# Patient Record
Sex: FEMALE | Race: BLACK OR AFRICAN AMERICAN | NOT HISPANIC OR LATINO | Employment: UNEMPLOYED | URBAN - METROPOLITAN AREA
[De-identification: names, ages, dates, MRNs, and addresses within clinical notes are randomized per-mention and may not be internally consistent; named-entity substitution may affect disease eponyms.]

---

## 2017-07-28 ENCOUNTER — HISTORICAL (OUTPATIENT)
Dept: LAB | Facility: HOSPITAL | Age: 77
End: 2017-07-28

## 2017-07-28 LAB
ABS NEUT (OLG): 4.2 X10(3)/MCL (ref 1.5–6.9)
ALBUMIN SERPL-MCNC: 3 GM/DL (ref 3.4–5)
ALBUMIN/GLOB SERPL: 0.6 RATIO
ALP SERPL-CCNC: 95 UNIT/L (ref 30–113)
ALT SERPL-CCNC: 14 UNIT/L (ref 10–45)
AST SERPL-CCNC: 21 UNIT/L (ref 15–37)
BASOPHILS # BLD AUTO: 0 X10(3)/MCL (ref 0–0.1)
BASOPHILS NFR BLD AUTO: 0 % (ref 0–1)
BILIRUB SERPL-MCNC: 0.3 MG/DL (ref 0.1–0.9)
BILIRUBIN DIRECT+TOT PNL SERPL-MCNC: 0.1 MG/DL (ref 0–0.3)
BILIRUBIN DIRECT+TOT PNL SERPL-MCNC: 0.2 MG/DL
BUN SERPL-MCNC: 16 MG/DL (ref 10–20)
CALCIUM SERPL-MCNC: 8.6 MG/DL (ref 8–10.5)
CHLORIDE SERPL-SCNC: 99 MMOL/L (ref 100–108)
CO2 SERPL-SCNC: 31 MMOL/L (ref 21–35)
COLOR STL: NORMAL
CONSISTENCY STL: NORMAL
CREAT SERPL-MCNC: 1.29 MG/DL (ref 0.7–1.3)
EOSINOPHIL # BLD AUTO: 0.2 X10(3)/MCL (ref 0–0.6)
EOSINOPHIL NFR BLD AUTO: 3 % (ref 0–5)
ERYTHROCYTE [DISTWIDTH] IN BLOOD BY AUTOMATED COUNT: 13.6 % (ref 11.5–17)
GLOBULIN SER-MCNC: 5.4 GM/DL
GLUCOSE SERPL-MCNC: 102 MG/DL (ref 75–116)
HCT VFR BLD AUTO: 34.5 % (ref 36–48)
HEMOCCULT SP1 STL QL: NEGATIVE
HGB BLD-MCNC: 11.5 GM/DL (ref 12–16)
INR PPP: 1 (ref 0–1.2)
LYMPHOCYTES # BLD AUTO: 1.9 X10(3)/MCL (ref 0.5–4.1)
LYMPHOCYTES NFR BLD AUTO: 27.3 % (ref 15–40)
MCH RBC QN AUTO: 28 PG (ref 27–34)
MCHC RBC AUTO-ENTMCNC: 33 GM/DL (ref 31–36)
MCV RBC AUTO: 85 FL (ref 80–99)
MONOCYTES # BLD AUTO: 0.7 X10(3)/MCL (ref 0–1.1)
MONOCYTES NFR BLD AUTO: 10 % (ref 4–12)
NEUTROPHILS # BLD AUTO: 4.2 X10(3)/MCL (ref 1.5–6.9)
NEUTROPHILS NFR BLD AUTO: 59 % (ref 43–75)
PLATELET # BLD AUTO: 263 X10(3)/MCL (ref 140–400)
PMV BLD AUTO: 9.9 FL (ref 6.8–10)
POTASSIUM SERPL-SCNC: 3.7 MMOL/L (ref 3.6–5.2)
PROT SERPL-MCNC: 8.4 GM/DL (ref 6.4–8.2)
PROTHROMBIN TIME: 10.7 SECOND(S) (ref 9–12)
RBC # BLD AUTO: 4.06 X10(6)/MCL (ref 4.2–5.4)
SODIUM SERPL-SCNC: 137 MMOL/L (ref 135–145)
WBC # SPEC AUTO: 7.1 X10(3)/MCL (ref 4.5–11.5)

## 2018-08-24 ENCOUNTER — HISTORICAL (OUTPATIENT)
Dept: LAB | Facility: HOSPITAL | Age: 78
End: 2018-08-24

## 2018-08-24 LAB
ABS NEUT (OLG): 2.6 X10(3)/MCL (ref 1.5–6.9)
ALBUMIN SERPL-MCNC: 3.1 GM/DL (ref 3.4–5)
ALBUMIN/GLOB SERPL: 0.6 RATIO
ALP SERPL-CCNC: 104 UNIT/L (ref 30–113)
ALT SERPL-CCNC: 14 UNIT/L (ref 10–45)
AST SERPL-CCNC: 21 UNIT/L (ref 15–37)
BASOPHILS # BLD AUTO: 0 X10(3)/MCL (ref 0–0.1)
BASOPHILS NFR BLD AUTO: 0 % (ref 0–1)
BILIRUB SERPL-MCNC: 0.3 MG/DL (ref 0.1–0.9)
BILIRUBIN DIRECT+TOT PNL SERPL-MCNC: 0.1 MG/DL (ref 0–0.3)
BILIRUBIN DIRECT+TOT PNL SERPL-MCNC: 0.2 MG/DL
BUN SERPL-MCNC: 18 MG/DL (ref 10–20)
CALCIUM SERPL-MCNC: 8.6 MG/DL (ref 8–10.5)
CHLORIDE SERPL-SCNC: 105 MMOL/L (ref 100–108)
CHOLEST SERPL-MCNC: 212 MG/DL (ref 140–200)
CHOLEST/HDLC SERPL: 5 MG/DL (ref 0–8)
CO2 SERPL-SCNC: 31 MMOL/L (ref 21–35)
CREAT SERPL-MCNC: 1.36 MG/DL (ref 0.7–1.3)
EOSINOPHIL # BLD AUTO: 0.1 X10(3)/MCL (ref 0–0.6)
EOSINOPHIL NFR BLD AUTO: 3 % (ref 0–5)
ERYTHROCYTE [DISTWIDTH] IN BLOOD BY AUTOMATED COUNT: 14.7 % (ref 11.5–17)
EST. AVERAGE GLUCOSE BLD GHB EST-MCNC: 120 MG/DL
FT4I SERPL CALC-MCNC: 2.4 (ref 5.9–13.1)
GLOBULIN SER-MCNC: 4.8 GM/DL
GLUCOSE SERPL-MCNC: 108 MG/DL (ref 75–116)
HBA1C MFR BLD: 5.8 % (ref 4.8–6)
HCT VFR BLD AUTO: 35.5 % (ref 36–48)
HDLC SERPL-MCNC: 44 MG/DL (ref 35–59)
HGB BLD-MCNC: 11.7 GM/DL (ref 12–16)
LDLC SERPL CALC-MCNC: 151 MG/DL (ref 100–129)
LYMPHOCYTES # BLD AUTO: 1.3 X10(3)/MCL (ref 0.5–4.1)
LYMPHOCYTES NFR BLD AUTO: 29.2 % (ref 15–40)
MCH RBC QN AUTO: 28 PG (ref 27–34)
MCHC RBC AUTO-ENTMCNC: 33 GM/DL (ref 31–36)
MCV RBC AUTO: 85 FL (ref 80–99)
MONOCYTES # BLD AUTO: 0.5 X10(3)/MCL (ref 0–1.1)
MONOCYTES NFR BLD AUTO: 10 % (ref 4–12)
NEUTROPHILS # BLD AUTO: 2.6 X10(3)/MCL (ref 1.5–6.9)
NEUTROPHILS NFR BLD AUTO: 57 % (ref 43–75)
PLATELET # BLD AUTO: 242 X10(3)/MCL (ref 140–400)
PMV BLD AUTO: 10.9 FL (ref 6.8–10)
POTASSIUM SERPL-SCNC: 4.3 MMOL/L (ref 3.6–5.2)
PROT SERPL-MCNC: 7.9 GM/DL (ref 6.4–8.2)
RBC # BLD AUTO: 4.19 X10(6)/MCL (ref 4.2–5.4)
SODIUM SERPL-SCNC: 139 MMOL/L (ref 135–145)
T3FREE SERPL-MCNC: 2.51 PG/ML (ref 2.18–3.98)
T3RU NFR SERPL: 31 % (ref 30–39)
T4 SERPL-MCNC: 7.9 MCG/DL (ref 5.3–11.5)
TRIGL SERPL-MCNC: 105 MG/DL (ref 35–150)
TSH SERPL-ACNC: 1.24 MIU/ML (ref 0.36–3.74)
VLDLC SERPL CALC-MCNC: 21 MG/DL
WBC # SPEC AUTO: 4.6 X10(3)/MCL (ref 4.5–11.5)

## 2019-08-09 ENCOUNTER — HISTORICAL (OUTPATIENT)
Dept: LAB | Facility: HOSPITAL | Age: 79
End: 2019-08-09

## 2019-08-09 LAB
ABS NEUT (OLG): 2.4 X10(3)/MCL (ref 1.5–6.9)
ALBUMIN SERPL-MCNC: 2.9 GM/DL (ref 3.4–5)
ALBUMIN/GLOB SERPL: 0.6 RATIO
ALP SERPL-CCNC: 110 UNIT/L (ref 30–113)
ALT SERPL-CCNC: 13 UNIT/L (ref 10–45)
AST SERPL-CCNC: 18 UNIT/L (ref 15–37)
BILIRUB SERPL-MCNC: 0.3 MG/DL (ref 0.1–0.9)
BILIRUBIN DIRECT+TOT PNL SERPL-MCNC: 0.1 MG/DL (ref 0–0.3)
BILIRUBIN DIRECT+TOT PNL SERPL-MCNC: 0.2 MG/DL
BUN SERPL-MCNC: 19 MG/DL (ref 10–20)
CALCIUM SERPL-MCNC: 8.1 MG/DL (ref 8–10.5)
CHLORIDE SERPL-SCNC: 107 MMOL/L (ref 100–108)
CHOLEST SERPL-MCNC: 191 MG/DL (ref 140–200)
CHOLEST/HDLC SERPL: 4 MG/DL (ref 0–8)
CO2 SERPL-SCNC: 27 MMOL/L (ref 21–35)
CREAT SERPL-MCNC: 1.54 MG/DL (ref 0.7–1.3)
ERYTHROCYTE [DISTWIDTH] IN BLOOD BY AUTOMATED COUNT: 14 % (ref 11.5–17)
EST. AVERAGE GLUCOSE BLD GHB EST-MCNC: 114 MG/DL
FT4I SERPL CALC-MCNC: 2 (ref 5.9–13.1)
GLOBULIN SER-MCNC: 4.5 GM/DL
GLUCOSE SERPL-MCNC: 98 MG/DL (ref 75–116)
HBA1C MFR BLD: 5.6 % (ref 4.8–6)
HCT VFR BLD AUTO: 33.1 % (ref 36–48)
HDLC SERPL-MCNC: 43 MG/DL (ref 35–59)
HGB BLD-MCNC: 11 GM/DL (ref 12–16)
LDLC SERPL CALC-MCNC: 127 MG/DL (ref 100–129)
MCH RBC QN AUTO: 29 PG (ref 27–34)
MCHC RBC AUTO-ENTMCNC: 33 GM/DL (ref 31–36)
MCV RBC AUTO: 88 FL (ref 80–99)
PLATELET # BLD AUTO: 196 X10(3)/MCL (ref 140–400)
PMV BLD AUTO: 9.8 FL (ref 6.8–10)
POTASSIUM SERPL-SCNC: 4.5 MMOL/L (ref 3.6–5.2)
PROT SERPL-MCNC: 7.4 GM/DL (ref 6.4–8.2)
RBC # BLD AUTO: 3.74 X10(6)/MCL (ref 4.2–5.4)
SODIUM SERPL-SCNC: 142 MMOL/L (ref 135–145)
T3FREE SERPL-MCNC: 2.35 PG/ML (ref 2.18–3.98)
T3RU NFR SERPL: 32 % (ref 30–39)
T4 SERPL-MCNC: 6.4 MCG/DL (ref 5.3–11.5)
TRIGL SERPL-MCNC: 107 MG/DL (ref 35–150)
TSH SERPL-ACNC: 1.84 MIU/ML (ref 0.36–3.74)
VLDLC SERPL CALC-MCNC: 21 MG/DL
WBC # SPEC AUTO: 4.6 X10(3)/MCL (ref 4.5–11.5)

## 2019-11-05 ENCOUNTER — HISTORICAL (OUTPATIENT)
Dept: RADIOLOGY | Facility: HOSPITAL | Age: 79
End: 2019-11-05

## 2019-12-13 ENCOUNTER — HISTORICAL (OUTPATIENT)
Dept: LAB | Facility: HOSPITAL | Age: 79
End: 2019-12-13

## 2019-12-13 LAB
ABS NEUT (OLG): 2.9 X10(3)/MCL (ref 1.5–6.9)
ALBUMIN SERPL-MCNC: 3 GM/DL (ref 3.4–5)
ALBUMIN/GLOB SERPL: 0.7 RATIO
ALP SERPL-CCNC: 131 UNIT/L (ref 30–113)
ALT SERPL-CCNC: 14 UNIT/L (ref 10–45)
AST SERPL-CCNC: 21 UNIT/L (ref 15–37)
BILIRUB SERPL-MCNC: 0.2 MG/DL (ref 0.1–0.9)
BUN SERPL-MCNC: 17 MG/DL (ref 10–20)
CALCIUM SERPL-MCNC: 8.2 MG/DL (ref 8–10.5)
CHLORIDE SERPL-SCNC: 105 MMOL/L (ref 100–108)
CHOLEST SERPL-MCNC: 173 MG/DL (ref 140–200)
CHOLEST/HDLC SERPL: 4 MG/DL (ref 0–8)
CO2 SERPL-SCNC: 28 MMOL/L (ref 21–35)
CORTIS SERPL-SCNC: 17 MCG/DL
CREAT SERPL-MCNC: 1.57 MG/DL (ref 0.7–1.3)
DEPRECATED CALCIDIOL+CALCIFEROL SERPL-MC: 13.82 NG/ML (ref 30–80)
ERYTHROCYTE [DISTWIDTH] IN BLOOD BY AUTOMATED COUNT: 13.4 % (ref 11.5–17)
ESTRADIOL SERPL HS-MCNC: 31 PG/ML
FOLATE SERPL-MCNC: 4.9 NG/ML (ref 8.6–58.9)
FSH SERPL-ACNC: 54.8 MIU/ML
FT4I SERPL CALC-MCNC: 2.5 (ref 5.9–13.1)
GLOBULIN SER-MCNC: 4.6 GM/DL
GLUCOSE SERPL-MCNC: 85 MG/DL (ref 75–116)
HCT VFR BLD AUTO: 35.4 % (ref 36–48)
HDLC SERPL-MCNC: 49 MG/DL (ref 35–59)
HGB BLD-MCNC: 11.3 GM/DL (ref 12–16)
LDLC SERPL CALC-MCNC: 110 MG/DL (ref 100–129)
MCH RBC QN AUTO: 29 PG (ref 27–34)
MCHC RBC AUTO-ENTMCNC: 32 GM/DL (ref 31–36)
MCV RBC AUTO: 91 FL (ref 80–99)
PLATELET # BLD AUTO: 219 X10(3)/MCL (ref 140–400)
PMV BLD AUTO: 9.8 FL (ref 6.8–10)
POTASSIUM SERPL-SCNC: 3.9 MMOL/L (ref 3.6–5.2)
PROGEST SERPL-MCNC: 0.31 NG/ML
PROT SERPL-MCNC: 7.6 GM/DL (ref 6.4–8.2)
RBC # BLD AUTO: 3.89 X10(6)/MCL (ref 4.2–5.4)
SODIUM SERPL-SCNC: 141 MMOL/L (ref 135–145)
T3FREE SERPL-MCNC: 2.46 PG/ML (ref 2.18–3.98)
T3RU NFR SERPL: 33 % (ref 30–39)
T4 SERPL-MCNC: 7.5 MCG/DL (ref 5.3–11.5)
TESTOST SERPL-MCNC: 48 NG/DL (ref 14–76)
TRIGL SERPL-MCNC: 106 MG/DL (ref 35–150)
TSH SERPL-ACNC: 1.68 MIU/ML (ref 0.36–3.74)
VLDLC SERPL CALC-MCNC: 21 MG/DL
WBC # SPEC AUTO: 5.3 X10(3)/MCL (ref 4.5–11.5)

## 2020-02-17 ENCOUNTER — HISTORICAL (OUTPATIENT)
Dept: LAB | Facility: HOSPITAL | Age: 80
End: 2020-02-17

## 2020-02-17 LAB
ABS NEUT (OLG): 2.4 X10(3)/MCL (ref 1.5–6.9)
ALBUMIN SERPL-MCNC: 3.1 GM/DL (ref 3.4–5)
ALBUMIN/GLOB SERPL: 0.7 RATIO
ALP SERPL-CCNC: 115 UNIT/L (ref 30–113)
ALT SERPL-CCNC: 13 UNIT/L (ref 10–45)
AST SERPL-CCNC: 23 UNIT/L (ref 15–37)
BILIRUB SERPL-MCNC: 0.2 MG/DL (ref 0.1–0.9)
BILIRUBIN DIRECT+TOT PNL SERPL-MCNC: 0.1 MG/DL
BILIRUBIN DIRECT+TOT PNL SERPL-MCNC: 0.1 MG/DL (ref 0–0.3)
BUN SERPL-MCNC: 30 MG/DL (ref 10–20)
CALCIUM SERPL-MCNC: 8.1 MG/DL (ref 8–10.5)
CHLORIDE SERPL-SCNC: 99 MMOL/L (ref 100–108)
CHOLEST SERPL-MCNC: 207 MG/DL (ref 140–200)
CHOLEST/HDLC SERPL: 4 MG/DL (ref 0–8)
CO2 SERPL-SCNC: 26 MMOL/L (ref 21–35)
CORTIS SERPL-SCNC: 17 MCG/DL
CREAT SERPL-MCNC: 1.67 MG/DL (ref 0.7–1.3)
DEPRECATED CALCIDIOL+CALCIFEROL SERPL-MC: 18.08 NG/ML (ref 30–80)
ERYTHROCYTE [DISTWIDTH] IN BLOOD BY AUTOMATED COUNT: 14.5 % (ref 11.5–17)
EST. AVERAGE GLUCOSE BLD GHB EST-MCNC: 114 MG/DL
ESTRADIOL SERPL HS-MCNC: 20 PG/ML
FSH SERPL-ACNC: 55.8 MIU/ML
GLOBULIN SER-MCNC: 4.5 GM/DL
GLUCOSE SERPL-MCNC: 97 MG/DL (ref 75–116)
HBA1C MFR BLD: 5.6 % (ref 4.8–6)
HCT VFR BLD AUTO: 33.2 % (ref 36–48)
HDLC SERPL-MCNC: 59 MG/DL (ref 35–59)
HGB BLD-MCNC: 10.8 GM/DL (ref 12–16)
LDLC SERPL CALC-MCNC: 131 MG/DL (ref 100–129)
MCH RBC QN AUTO: 28 PG (ref 27–34)
MCHC RBC AUTO-ENTMCNC: 32 GM/DL (ref 31–36)
MCV RBC AUTO: 88 FL (ref 80–99)
PLATELET # BLD AUTO: 198 X10(3)/MCL (ref 140–400)
PMV BLD AUTO: 10.4 FL (ref 6.8–10)
POTASSIUM SERPL-SCNC: 4.1 MMOL/L (ref 3.6–5.2)
PROGEST SERPL-MCNC: 0.39 NG/ML
PROT SERPL-MCNC: 7.6 GM/DL (ref 6.4–8.2)
RBC # BLD AUTO: 3.79 X10(6)/MCL (ref 4.2–5.4)
SODIUM SERPL-SCNC: 136 MMOL/L (ref 135–145)
T3FREE SERPL-MCNC: 2.11 PG/ML (ref 2.18–3.98)
TESTOST SERPL-MCNC: 50 NG/DL (ref 14–76)
TRIGL SERPL-MCNC: 88 MG/DL (ref 35–150)
VLDLC SERPL CALC-MCNC: 18 MG/DL
WBC # SPEC AUTO: 4.3 X10(3)/MCL (ref 4.5–11.5)

## 2020-11-13 ENCOUNTER — HISTORICAL (OUTPATIENT)
Dept: LAB | Facility: HOSPITAL | Age: 80
End: 2020-11-13

## 2020-11-13 LAB
T3FREE SERPL-MCNC: 2.23 PG/ML (ref 1.71–3.71)
T4 SERPL-MCNC: 8.39 UG/DL (ref 4.87–11.72)
TESTOST SERPL-MCNC: 49.67 NG/DL (ref 12.4–35.76)
TSH SERPL-ACNC: 1.65 UIU/ML (ref 0.35–4.94)

## 2021-04-05 ENCOUNTER — HISTORICAL (OUTPATIENT)
Dept: LAB | Facility: HOSPITAL | Age: 81
End: 2021-04-05

## 2021-04-05 LAB
ABS NEUT (OLG): 3.4 X10(3)/MCL (ref 1.5–6.9)
ALBUMIN SERPL-MCNC: 3.3 GM/DL (ref 3.4–4.8)
ALBUMIN/GLOB SERPL: 0.8 RATIO (ref 1.1–2)
ALP SERPL-CCNC: 97 UNIT/L (ref 40–150)
ALT SERPL-CCNC: 8 UNIT/L (ref 0–55)
AST SERPL-CCNC: 20 UNIT/L (ref 5–34)
BASOPHILS # BLD AUTO: 0 X10(3)/MCL (ref 0–0.1)
BASOPHILS NFR BLD AUTO: 1 % (ref 0–1)
BILIRUB SERPL-MCNC: 0.4 MG/DL
BILIRUBIN DIRECT+TOT PNL SERPL-MCNC: 0.2 MG/DL (ref 0–0.5)
BILIRUBIN DIRECT+TOT PNL SERPL-MCNC: 0.2 MG/DL (ref 0–0.8)
BUN SERPL-MCNC: 28 MG/DL (ref 9.8–20.1)
CALCIUM SERPL-MCNC: 8.9 MG/DL (ref 8.4–10.2)
CHLORIDE SERPL-SCNC: 102 MMOL/L (ref 98–107)
CHOLEST SERPL-MCNC: 208 MG/DL
CHOLEST/HDLC SERPL: 4 {RATIO} (ref 0–5)
CO2 SERPL-SCNC: 31 MMOL/L (ref 23–31)
CREAT SERPL-MCNC: 1.43 MG/DL (ref 0.55–1.02)
DEPRECATED CALCIDIOL+CALCIFEROL SERPL-MC: 27.5 NG/ML (ref 30–80)
EOSINOPHIL # BLD AUTO: 0.2 X10(3)/MCL (ref 0–0.6)
EOSINOPHIL NFR BLD AUTO: 4 % (ref 0–5)
ERYTHROCYTE [DISTWIDTH] IN BLOOD BY AUTOMATED COUNT: 14 % (ref 11.5–17)
ERYTHROCYTE [SEDIMENTATION RATE] IN BLOOD: 21 MM/HR (ref 0–20)
GLOBULIN SER-MCNC: 4.4 GM/DL (ref 2.4–3.5)
GLUCOSE SERPL-MCNC: 101 MG/DL (ref 82–115)
HCT VFR BLD AUTO: 36.3 % (ref 36–48)
HDLC SERPL-MCNC: 47 MG/DL (ref 35–60)
HGB BLD-MCNC: 11.9 GM/DL (ref 12–16)
IMM GRANULOCYTES # BLD AUTO: 0.01 10*3/UL (ref 0–0.02)
IMM GRANULOCYTES NFR BLD AUTO: 0.2 % (ref 0–0.43)
LDLC SERPL CALC-MCNC: 144 MG/DL (ref 50–140)
LYMPHOCYTES # BLD AUTO: 1.2 X10(3)/MCL (ref 0.5–4.1)
LYMPHOCYTES NFR BLD AUTO: 23 % (ref 15–40)
MCH RBC QN AUTO: 29 PG (ref 27–34)
MCHC RBC AUTO-ENTMCNC: 33 GM/DL (ref 31–36)
MCV RBC AUTO: 88 FL (ref 80–99)
MONOCYTES # BLD AUTO: 0.5 X10(3)/MCL (ref 0–1.1)
MONOCYTES NFR BLD AUTO: 9 % (ref 4–12)
NEUTROPHILS # BLD AUTO: 3.4 X10(3)/MCL (ref 1.5–6.9)
NEUTROPHILS NFR BLD AUTO: 63 % (ref 43–75)
PLATELET # BLD AUTO: 261 X10(3)/MCL (ref 140–400)
PMV BLD AUTO: 9.3 FL (ref 6.8–10)
POTASSIUM SERPL-SCNC: 3.5 MMOL/L (ref 3.5–5.1)
PROT SERPL-MCNC: 7.7 GM/DL (ref 5.8–7.6)
RBC # BLD AUTO: 4.15 X10(6)/MCL (ref 4.2–5.4)
RHEUMATOID FACT SERPL-ACNC: >2000 IU/ML
SODIUM SERPL-SCNC: 142 MMOL/L (ref 136–145)
T3FREE SERPL-MCNC: 2.71 PG/ML (ref 1.58–3.91)
T3RU NFR SERPL: 32.25 % (ref 31–39)
T4 SERPL-MCNC: 6.98 UG/DL (ref 4.87–11.72)
TRIGL SERPL-MCNC: 85 MG/DL (ref 37–140)
TSH SERPL-ACNC: 1.58 UIU/ML (ref 0.35–4.94)
VLDLC SERPL CALC-MCNC: 17 MG/DL
WBC # SPEC AUTO: 5.3 X10(3)/MCL (ref 4.5–11.5)

## 2022-01-06 ENCOUNTER — HISTORICAL (OUTPATIENT)
Dept: LAB | Facility: HOSPITAL | Age: 82
End: 2022-01-06

## 2022-01-06 LAB
ABS NEUT (OLG): 4 X10(3)/MCL (ref 1.5–6.9)
ALBUMIN SERPL-MCNC: 3.4 GM/DL (ref 3.4–4.8)
ALBUMIN/GLOB SERPL: 0.9 RATIO (ref 1.1–2)
ALP SERPL-CCNC: 87 UNIT/L (ref 40–150)
ALT SERPL-CCNC: 8 UNIT/L (ref 0–55)
AST SERPL-CCNC: 16 UNIT/L (ref 5–34)
BASOPHILS # BLD AUTO: 0 X10(3)/MCL (ref 0–0.1)
BASOPHILS NFR BLD AUTO: 0 % (ref 0–1)
BILIRUB SERPL-MCNC: 0.4 MG/DL
BILIRUBIN DIRECT+TOT PNL SERPL-MCNC: 0.1 MG/DL (ref 0–0.5)
BILIRUBIN DIRECT+TOT PNL SERPL-MCNC: 0.3 MG/DL (ref 0–0.8)
BUN SERPL-MCNC: 11 MG/DL (ref 9.8–20.1)
CALCIUM SERPL-MCNC: 9.1 MG/DL (ref 8.7–10.5)
CHLORIDE SERPL-SCNC: 102 MMOL/L (ref 98–107)
CO2 SERPL-SCNC: 29 MMOL/L (ref 23–31)
CREAT SERPL-MCNC: 1.21 MG/DL (ref 0.55–1.02)
DEPRECATED CALCIDIOL+CALCIFEROL SERPL-MC: 29.6 NG/ML (ref 30–80)
EOSINOPHIL # BLD AUTO: 0.1 X10(3)/MCL (ref 0–0.6)
EOSINOPHIL NFR BLD AUTO: 1 % (ref 0–5)
ERYTHROCYTE [DISTWIDTH] IN BLOOD BY AUTOMATED COUNT: 13.2 % (ref 11.5–17)
GLOBULIN SER-MCNC: 3.7 GM/DL (ref 2.4–3.5)
GLUCOSE SERPL-MCNC: 120 MG/DL (ref 82–115)
HCT VFR BLD AUTO: 37.6 % (ref 36–48)
HGB BLD-MCNC: 12.6 GM/DL (ref 12–16)
IMM GRANULOCYTES # BLD AUTO: 0.01 10*3/UL (ref 0–0.02)
IMM GRANULOCYTES NFR BLD AUTO: 0.2 % (ref 0–0.43)
LYMPHOCYTES # BLD AUTO: 1.6 X10(3)/MCL (ref 0.5–4.1)
LYMPHOCYTES NFR BLD AUTO: 26 % (ref 15–40)
MCH RBC QN AUTO: 29 PG (ref 27–34)
MCHC RBC AUTO-ENTMCNC: 34 GM/DL (ref 31–36)
MCV RBC AUTO: 85 FL (ref 80–99)
MONOCYTES # BLD AUTO: 0.5 X10(3)/MCL (ref 0–1.1)
MONOCYTES NFR BLD AUTO: 8 % (ref 4–12)
NEUTROPHILS # BLD AUTO: 4 X10(3)/MCL (ref 1.5–6.9)
NEUTROPHILS NFR BLD AUTO: 64 % (ref 43–75)
PLATELET # BLD AUTO: 234 X10(3)/MCL (ref 140–400)
PMV BLD AUTO: 10.3 FL (ref 6.8–10)
POTASSIUM SERPL-SCNC: 3.9 MMOL/L (ref 3.5–5.1)
PROT SERPL-MCNC: 7.1 GM/DL (ref 5.8–7.6)
RBC # BLD AUTO: 4.41 X10(6)/MCL (ref 4.2–5.4)
SODIUM SERPL-SCNC: 141 MMOL/L (ref 136–145)
T3FREE SERPL-MCNC: 2.39 PG/ML (ref 1.58–3.91)
T3RU NFR SERPL: 36.04 % (ref 31–39)
T4 SERPL-MCNC: 9.06 UG/DL (ref 4.87–11.72)
TSH SERPL-ACNC: 1.4 UIU/ML (ref 0.35–4.94)
WBC # SPEC AUTO: 6.2 X10(3)/MCL (ref 4.5–11.5)

## 2022-02-09 ENCOUNTER — HISTORICAL (OUTPATIENT)
Dept: LAB | Facility: HOSPITAL | Age: 82
End: 2022-02-09

## 2022-02-09 LAB
CHOLEST SERPL-MCNC: 227 MG/DL
CHOLEST/HDLC SERPL: 5 {RATIO} (ref 0–5)
EST. AVERAGE GLUCOSE BLD GHB EST-MCNC: 108.3 MG/DL
HBA1C MFR BLD: 5.4 %
HDLC SERPL-MCNC: 48 MG/DL (ref 35–60)
LDLC SERPL CALC-MCNC: 159 MG/DL (ref 50–140)
TRIGL SERPL-MCNC: 100 MG/DL (ref 37–140)
VLDLC SERPL CALC-MCNC: 20 MG/DL

## 2022-09-27 ENCOUNTER — OFFICE VISIT (OUTPATIENT)
Dept: NEUROLOGY | Facility: CLINIC | Age: 82
End: 2022-09-27
Payer: MEDICARE

## 2022-09-27 VITALS
BODY MASS INDEX: 32.14 KG/M2 | WEIGHT: 200 LBS | HEIGHT: 66 IN | DIASTOLIC BLOOD PRESSURE: 72 MMHG | SYSTOLIC BLOOD PRESSURE: 148 MMHG

## 2022-09-27 DIAGNOSIS — F02.80 ALZHEIMER DISEASE: Primary | ICD-10-CM

## 2022-09-27 DIAGNOSIS — G30.9 ALZHEIMER DISEASE: Primary | ICD-10-CM

## 2022-09-27 PROCEDURE — 99999 PR PBB SHADOW E&M-EST. PATIENT-LVL III: ICD-10-PCS | Mod: PBBFAC,,, | Performed by: PSYCHIATRY & NEUROLOGY

## 2022-09-27 PROCEDURE — 3288F PR FALLS RISK ASSESSMENT DOCUMENTED: ICD-10-PCS | Mod: CPTII,S$GLB,, | Performed by: PSYCHIATRY & NEUROLOGY

## 2022-09-27 PROCEDURE — 1159F PR MEDICATION LIST DOCUMENTED IN MEDICAL RECORD: ICD-10-PCS | Mod: CPTII,S$GLB,, | Performed by: PSYCHIATRY & NEUROLOGY

## 2022-09-27 PROCEDURE — 1101F PR PT FALLS ASSESS DOC 0-1 FALLS W/OUT INJ PAST YR: ICD-10-PCS | Mod: CPTII,S$GLB,, | Performed by: PSYCHIATRY & NEUROLOGY

## 2022-09-27 PROCEDURE — 3288F FALL RISK ASSESSMENT DOCD: CPT | Mod: CPTII,S$GLB,, | Performed by: PSYCHIATRY & NEUROLOGY

## 2022-09-27 PROCEDURE — 3077F PR MOST RECENT SYSTOLIC BLOOD PRESSURE >= 140 MM HG: ICD-10-PCS | Mod: CPTII,S$GLB,, | Performed by: PSYCHIATRY & NEUROLOGY

## 2022-09-27 PROCEDURE — 99999 PR PBB SHADOW E&M-EST. PATIENT-LVL III: CPT | Mod: PBBFAC,,, | Performed by: PSYCHIATRY & NEUROLOGY

## 2022-09-27 PROCEDURE — 3077F SYST BP >= 140 MM HG: CPT | Mod: CPTII,S$GLB,, | Performed by: PSYCHIATRY & NEUROLOGY

## 2022-09-27 PROCEDURE — 3078F DIAST BP <80 MM HG: CPT | Mod: CPTII,S$GLB,, | Performed by: PSYCHIATRY & NEUROLOGY

## 2022-09-27 PROCEDURE — 99204 OFFICE O/P NEW MOD 45 MIN: CPT | Mod: S$GLB,,, | Performed by: PSYCHIATRY & NEUROLOGY

## 2022-09-27 PROCEDURE — 1101F PT FALLS ASSESS-DOCD LE1/YR: CPT | Mod: CPTII,S$GLB,, | Performed by: PSYCHIATRY & NEUROLOGY

## 2022-09-27 PROCEDURE — 99204 PR OFFICE/OUTPT VISIT, NEW, LEVL IV, 45-59 MIN: ICD-10-PCS | Mod: S$GLB,,, | Performed by: PSYCHIATRY & NEUROLOGY

## 2022-09-27 PROCEDURE — 3078F PR MOST RECENT DIASTOLIC BLOOD PRESSURE < 80 MM HG: ICD-10-PCS | Mod: CPTII,S$GLB,, | Performed by: PSYCHIATRY & NEUROLOGY

## 2022-09-27 PROCEDURE — 1159F MED LIST DOCD IN RCRD: CPT | Mod: CPTII,S$GLB,, | Performed by: PSYCHIATRY & NEUROLOGY

## 2022-09-27 RX ORDER — AMLODIPINE BESYLATE 5 MG/1
5 TABLET ORAL DAILY
COMMUNITY

## 2022-09-27 RX ORDER — ACETAMINOPHEN 325 MG/1
325 TABLET ORAL EVERY 6 HOURS PRN
COMMUNITY

## 2022-09-27 RX ORDER — DONEPEZIL HYDROCHLORIDE 5 MG/1
5 TABLET, FILM COATED ORAL NIGHTLY
COMMUNITY

## 2022-09-27 RX ORDER — LEVOTHYROXINE SODIUM 100 UG/1
CAPSULE ORAL
COMMUNITY
End: 2022-09-27 | Stop reason: SDUPTHER

## 2022-09-27 RX ORDER — MELOXICAM 7.5 MG/1
7.5 TABLET ORAL DAILY
COMMUNITY

## 2022-09-27 RX ORDER — VERAPAMIL HYDROCHLORIDE 240 MG/1
240 TABLET, FILM COATED, EXTENDED RELEASE ORAL NIGHTLY
COMMUNITY

## 2022-09-27 RX ORDER — LEVOTHYROXINE SODIUM 100 UG/1
100 TABLET ORAL
COMMUNITY

## 2022-09-27 RX ORDER — LIOTHYRONINE SODIUM 5 UG/1
25 TABLET ORAL DAILY
COMMUNITY

## 2022-09-27 RX ORDER — PAROXETINE 10 MG/1
10 TABLET, FILM COATED ORAL EVERY MORNING
COMMUNITY

## 2022-09-27 RX ORDER — TRIAMTERENE AND HYDROCHLOROTHIAZIDE 37.5; 25 MG/1; MG/1
1 CAPSULE ORAL EVERY MORNING
COMMUNITY

## 2022-09-27 RX ORDER — NAPROXEN 500 MG/1
500 TABLET ORAL 2 TIMES DAILY WITH MEALS
COMMUNITY

## 2022-09-27 RX ORDER — MEMANTINE HYDROCHLORIDE 5 MG/1
5 TABLET ORAL 2 TIMES DAILY
COMMUNITY

## 2022-09-27 NOTE — PROGRESS NOTES
Subjective:       Patient ID: Kike Bass is a 82 y.o. female.    Chief Complaint: Dementia (New patient referred by Kaylene Landry NP for dementia. Patient does not have accurate medication list with her today.)    HPI 3+ years of progressive memory loss  Has been rx'd memory meds for about a year; compliance cannot be guaranteed  She lives at home with ; her two children are both present today but live out of town  No falls  Independent with ADLs  No longer drives/pays bills/works, etc  We basically had a family conference  Review of Systems    The remainder of the 14 system ROS is noncontributory or negative unless mentioned/reviewed above.    Objective:      Physical Exam  Mental Status: Alert and oriented x3. Language is fluent with good comprehension.    Cranial Nerve: Pupils are equal, round, and reactive to light. Visual fields are intact to confrontation. Normal fundi. Ocular movements are intact. Face is symmetric at rest and with activation with intact sensation throughout. Hearing intact to finger rub bilaterally. Muscles of tongue and palate activate symmetrically. No dysarthria. Strength is full in sternocleidomastoid and trapezius bilaterally.    Motor: Muscle bulk and tone are normal. Strength is 5/5 in all four extremities both proximally and distally. Intact fine motor movements bilaterally. There is no pronator drift or satelliting on arm roll.    Sensory: Sensation is intact to light touch, pinprick, vibration, and proprioception throughout. Romberg is negative.    Reflexes: 2+ and symmetric at the biceps, triceps, brachioradialis, patella, and Achilles bilaterally. Plantar response is flexor bilaterally.    Coordination: No dysmetria on finger-nose-finger or heel-knee-shin. Normal rapid alternating movements. Fast finger tapping with normal amplitude and speed.    Gait: Narrow based with normal stride length and good arm swing bilaterally. Able to walk on heels, toes, and in  tandem.    Mmse 17/30  Assessment:       Problem List Items Addressed This Visit    None  Visit Diagnoses       Alzheimer disease    -  Primary              Plan:       AD  Moderate  Memory meds optional  ?s answered

## 2023-08-30 ENCOUNTER — LAB VISIT (OUTPATIENT)
Dept: LAB | Facility: HOSPITAL | Age: 83
End: 2023-08-30
Attending: INTERNAL MEDICINE
Payer: MEDICARE

## 2023-08-30 DIAGNOSIS — I10 ESSENTIAL HYPERTENSION, MALIGNANT: Primary | ICD-10-CM

## 2023-08-30 DIAGNOSIS — E78.2 MIXED HYPERLIPIDEMIA: ICD-10-CM

## 2023-08-30 LAB
ANION GAP SERPL CALC-SCNC: 6 MEQ/L
BUN SERPL-MCNC: 7 MG/DL (ref 9.8–20.1)
CALCIUM SERPL-MCNC: 8.6 MG/DL (ref 8.4–10.2)
CHLORIDE SERPL-SCNC: 103 MMOL/L (ref 98–107)
CHOLEST SERPL-MCNC: 193 MG/DL
CHOLEST/HDLC SERPL: 5 {RATIO} (ref 0–5)
CO2 SERPL-SCNC: 31 MMOL/L (ref 23–31)
CREAT SERPL-MCNC: 1.16 MG/DL (ref 0.55–1.02)
CREAT/UREA NIT SERPL: 6
GFR SERPLBLD CREATININE-BSD FMLA CKD-EPI: 47 MLS/MIN/1.73/M2
GLUCOSE SERPL-MCNC: 110 MG/DL (ref 82–115)
HDLC SERPL-MCNC: 37 MG/DL (ref 35–60)
LDLC SERPL CALC-MCNC: 133 MG/DL (ref 50–140)
POTASSIUM SERPL-SCNC: 3.8 MMOL/L (ref 3.5–5.1)
SODIUM SERPL-SCNC: 140 MMOL/L (ref 136–145)
TRIGL SERPL-MCNC: 113 MG/DL (ref 37–140)
VLDLC SERPL CALC-MCNC: 23 MG/DL

## 2023-08-30 PROCEDURE — 80048 BASIC METABOLIC PNL TOTAL CA: CPT

## 2023-08-30 PROCEDURE — 36415 COLL VENOUS BLD VENIPUNCTURE: CPT

## 2023-08-30 PROCEDURE — 80061 LIPID PANEL: CPT

## 2023-10-11 ENCOUNTER — LAB VISIT (OUTPATIENT)
Dept: LAB | Facility: HOSPITAL | Age: 83
End: 2023-10-11
Attending: NURSE PRACTITIONER
Payer: MEDICARE

## 2023-10-11 DIAGNOSIS — R78.89 FINDING OF OTHER SPECIFIED SUBSTANCES, NOT NORMALLY FOUND IN BLOOD: ICD-10-CM

## 2023-10-11 DIAGNOSIS — R94.6 ABNORMAL RESULTS OF THYROID FUNCTION STUDIES: ICD-10-CM

## 2023-10-11 DIAGNOSIS — R93.819 ABNORMAL RADIOLOGIC FINDINGS ON DIAGNOSTIC IMAGING OF UNSPECIFIED TESTICLE: ICD-10-CM

## 2023-10-11 DIAGNOSIS — Z00.00 ROUTINE GENERAL MEDICAL EXAMINATION AT A HEALTH CARE FACILITY: Primary | ICD-10-CM

## 2023-10-11 DIAGNOSIS — E55.9 VITAMIN D DEFICIENCY: ICD-10-CM

## 2023-10-11 LAB
ALBUMIN SERPL-MCNC: 3.1 G/DL (ref 3.4–4.8)
ALBUMIN/GLOB SERPL: 0.8 RATIO (ref 1.1–2)
ALP SERPL-CCNC: 72 UNIT/L (ref 40–150)
ALT SERPL-CCNC: 8 UNIT/L (ref 0–55)
AST SERPL-CCNC: 20 UNIT/L (ref 5–34)
BILIRUB SERPL-MCNC: 0.4 MG/DL
BUN SERPL-MCNC: 8 MG/DL (ref 9.8–20.1)
CALCIUM SERPL-MCNC: 8.6 MG/DL (ref 8.4–10.2)
CHLORIDE SERPL-SCNC: 101 MMOL/L (ref 98–107)
CHOLEST SERPL-MCNC: 181 MG/DL
CHOLEST/HDLC SERPL: 4 {RATIO} (ref 0–5)
CO2 SERPL-SCNC: 30 MMOL/L (ref 23–31)
CREAT SERPL-MCNC: 1.39 MG/DL (ref 0.55–1.02)
DEPRECATED CALCIDIOL+CALCIFEROL SERPL-MC: 27.4 NG/ML (ref 30–80)
GFR SERPLBLD CREATININE-BSD FMLA CKD-EPI: 38 MLS/MIN/1.73/M2
GLOBULIN SER-MCNC: 4 GM/DL (ref 2.4–3.5)
GLUCOSE SERPL-MCNC: 98 MG/DL (ref 82–115)
HDLC SERPL-MCNC: 42 MG/DL (ref 35–60)
LDLC SERPL CALC-MCNC: 122 MG/DL (ref 50–140)
POTASSIUM SERPL-SCNC: 3.9 MMOL/L (ref 3.5–5.1)
PROT SERPL-MCNC: 7.1 GM/DL (ref 5.8–7.6)
SODIUM SERPL-SCNC: 139 MMOL/L (ref 136–145)
T3FREE SERPL-MCNC: 2.65 PG/ML (ref 1.58–3.91)
T3RU NFR SERPL: 34.64 % (ref 31–39)
T4 SERPL-MCNC: 10.5 UG/DL (ref 4.87–11.72)
TRIGL SERPL-MCNC: 86 MG/DL (ref 37–140)
TSH SERPL-ACNC: 1.93 UIU/ML (ref 0.35–4.94)
VLDLC SERPL CALC-MCNC: 17 MG/DL

## 2023-10-11 PROCEDURE — 84479 ASSAY OF THYROID (T3 OR T4): CPT

## 2023-10-11 PROCEDURE — 84443 ASSAY THYROID STIM HORMONE: CPT

## 2023-10-11 PROCEDURE — 80053 COMPREHEN METABOLIC PANEL: CPT

## 2023-10-11 PROCEDURE — 84436 ASSAY OF TOTAL THYROXINE: CPT

## 2023-10-11 PROCEDURE — 84481 FREE ASSAY (FT-3): CPT

## 2023-10-11 PROCEDURE — 36415 COLL VENOUS BLD VENIPUNCTURE: CPT

## 2023-10-11 PROCEDURE — 82306 VITAMIN D 25 HYDROXY: CPT

## 2023-10-11 PROCEDURE — 80061 LIPID PANEL: CPT

## 2025-01-01 ENCOUNTER — HOSPITAL ENCOUNTER (OUTPATIENT)
Facility: HOSPITAL | Age: 85
Discharge: HOME | End: 2025-01-02
Attending: EMERGENCY MEDICINE | Admitting: STUDENT IN AN ORGANIZED HEALTH CARE EDUCATION/TRAINING PROGRAM
Payer: MEDICARE

## 2025-01-01 DIAGNOSIS — R41.0 DISORIENTATION: Primary | ICD-10-CM

## 2025-01-01 DIAGNOSIS — R07.9 CHEST PAIN: ICD-10-CM

## 2025-01-01 DIAGNOSIS — R00.1 BRADYCARDIA: ICD-10-CM

## 2025-01-01 DIAGNOSIS — R55 SYNCOPE: ICD-10-CM

## 2025-01-01 DIAGNOSIS — R41.82 AMS (ALTERED MENTAL STATUS): ICD-10-CM

## 2025-01-01 LAB
ALBUMIN SERPL BCP-MCNC: 2.8 G/DL (ref 3.5–5.2)
ALP SERPL-CCNC: 86 U/L (ref 40–150)
ALT SERPL W/O P-5'-P-CCNC: 16 U/L (ref 10–44)
ANION GAP SERPL CALC-SCNC: 12 MMOL/L (ref 8–16)
AST SERPL-CCNC: 25 U/L (ref 10–40)
BASOPHILS # BLD AUTO: 0.03 K/UL (ref 0–0.2)
BASOPHILS NFR BLD: 0.4 % (ref 0–1.9)
BILIRUB SERPL-MCNC: 0.2 MG/DL (ref 0.1–1)
BUN SERPL-MCNC: 24 MG/DL (ref 8–23)
CALCIUM SERPL-MCNC: 8.1 MG/DL (ref 8.7–10.5)
CHLORIDE SERPL-SCNC: 106 MMOL/L (ref 95–110)
CO2 SERPL-SCNC: 22 MMOL/L (ref 23–29)
CREAT SERPL-MCNC: 1.8 MG/DL (ref 0.5–1.4)
DIFFERENTIAL METHOD BLD: ABNORMAL
EOSINOPHIL # BLD AUTO: 0.1 K/UL (ref 0–0.5)
EOSINOPHIL NFR BLD: 1.8 % (ref 0–8)
ERYTHROCYTE [DISTWIDTH] IN BLOOD BY AUTOMATED COUNT: 13.8 % (ref 11.5–14.5)
EST. GFR  (NO RACE VARIABLE): 28 ML/MIN/1.73 M^2
GLUCOSE SERPL-MCNC: 119 MG/DL (ref 70–110)
HCT VFR BLD AUTO: 33.6 % (ref 37–48.5)
HGB BLD-MCNC: 11.4 G/DL (ref 12–16)
IMM GRANULOCYTES # BLD AUTO: 0.03 K/UL (ref 0–0.04)
IMM GRANULOCYTES NFR BLD AUTO: 0.4 % (ref 0–0.5)
LYMPHOCYTES # BLD AUTO: 2.5 K/UL (ref 1–4.8)
LYMPHOCYTES NFR BLD: 35 % (ref 18–48)
MCH RBC QN AUTO: 29.7 PG (ref 27–31)
MCHC RBC AUTO-ENTMCNC: 33.9 G/DL (ref 32–36)
MCV RBC AUTO: 88 FL (ref 82–98)
MONOCYTES # BLD AUTO: 0.7 K/UL (ref 0.3–1)
MONOCYTES NFR BLD: 10 % (ref 4–15)
NEUTROPHILS # BLD AUTO: 3.7 K/UL (ref 1.8–7.7)
NEUTROPHILS NFR BLD: 52.4 % (ref 38–73)
NRBC BLD-RTO: 0 /100 WBC
PLATELET # BLD AUTO: 172 K/UL (ref 150–450)
PMV BLD AUTO: 11.2 FL (ref 9.2–12.9)
POTASSIUM SERPL-SCNC: 3.9 MMOL/L (ref 3.5–5.1)
PROT SERPL-MCNC: 6.6 G/DL (ref 6–8.4)
RBC # BLD AUTO: 3.84 M/UL (ref 4–5.4)
SODIUM SERPL-SCNC: 140 MMOL/L (ref 136–145)
TROPONIN I SERPL DL<=0.01 NG/ML-MCNC: 0.01 NG/ML (ref 0–0.03)
WBC # BLD AUTO: 7.11 K/UL (ref 3.9–12.7)

## 2025-01-01 PROCEDURE — 80053 COMPREHEN METABOLIC PANEL: CPT | Performed by: EMERGENCY MEDICINE

## 2025-01-01 PROCEDURE — 93010 ELECTROCARDIOGRAM REPORT: CPT | Mod: ,,, | Performed by: INTERNAL MEDICINE

## 2025-01-01 PROCEDURE — 96360 HYDRATION IV INFUSION INIT: CPT

## 2025-01-01 PROCEDURE — 83735 ASSAY OF MAGNESIUM: CPT | Performed by: EMERGENCY MEDICINE

## 2025-01-01 PROCEDURE — 84484 ASSAY OF TROPONIN QUANT: CPT | Performed by: EMERGENCY MEDICINE

## 2025-01-01 PROCEDURE — 96361 HYDRATE IV INFUSION ADD-ON: CPT

## 2025-01-01 PROCEDURE — 85025 COMPLETE CBC W/AUTO DIFF WBC: CPT | Performed by: EMERGENCY MEDICINE

## 2025-01-01 PROCEDURE — 99285 EMERGENCY DEPT VISIT HI MDM: CPT | Mod: 25

## 2025-01-01 PROCEDURE — 93005 ELECTROCARDIOGRAM TRACING: CPT

## 2025-01-01 NOTE — Clinical Note
Diagnosis: AMS (altered mental status) [[id number]]   Future Attending Provider: PARVIZ SANTANA [[id number]]

## 2025-01-02 VITALS
DIASTOLIC BLOOD PRESSURE: 65 MMHG | SYSTOLIC BLOOD PRESSURE: 149 MMHG | HEIGHT: 66 IN | WEIGHT: 190 LBS | HEART RATE: 62 BPM | BODY MASS INDEX: 30.53 KG/M2 | TEMPERATURE: 98 F | OXYGEN SATURATION: 100 % | RESPIRATION RATE: 17 BRPM

## 2025-01-02 PROBLEM — E03.9 HYPOTHYROIDISM: Status: ACTIVE | Noted: 2025-01-02

## 2025-01-02 PROBLEM — N17.9 AKI (ACUTE KIDNEY INJURY): Status: ACTIVE | Noted: 2025-01-02

## 2025-01-02 PROBLEM — R55 NEAR SYNCOPE: Status: ACTIVE | Noted: 2025-01-02

## 2025-01-02 PROBLEM — F03.90 DEMENTIA: Status: ACTIVE | Noted: 2025-01-02

## 2025-01-02 PROBLEM — R41.82 AMS (ALTERED MENTAL STATUS): Status: ACTIVE | Noted: 2025-01-02

## 2025-01-02 PROBLEM — R00.1 BRADYCARDIA: Status: ACTIVE | Noted: 2025-01-02

## 2025-01-02 PROBLEM — I10 HTN (HYPERTENSION): Status: ACTIVE | Noted: 2025-01-02

## 2025-01-02 LAB
ANION GAP SERPL CALC-SCNC: 9 MMOL/L (ref 8–16)
APICAL FOUR CHAMBER EJECTION FRACTION: 77 %
APICAL TWO CHAMBER EJECTION FRACTION: 63 %
ASCENDING AORTA: 2.9 CM
AV INDEX (PROSTH): 0.86
AV MEAN GRADIENT: 3.4 MMHG
AV PEAK GRADIENT: 6.8 MMHG
AV REGURGITATION PRESSURE HALF TIME: 891.14 MS
AV VALVE AREA BY VELOCITY RATIO: 3.2 CM²
AV VALVE AREA: 3.3 CM²
AV VELOCITY RATIO: 0.85
BACTERIA #/AREA URNS HPF: ABNORMAL /HPF
BASOPHILS # BLD AUTO: 0.02 K/UL (ref 0–0.2)
BASOPHILS NFR BLD: 0.3 % (ref 0–1.9)
BILIRUB UR QL STRIP: NEGATIVE
BSA FOR ECHO PROCEDURE: 2 M2
BUN SERPL-MCNC: 24 MG/DL (ref 8–23)
CALCIUM SERPL-MCNC: 8.3 MG/DL (ref 8.7–10.5)
CHLORIDE SERPL-SCNC: 105 MMOL/L (ref 95–110)
CHOLEST SERPL-MCNC: 144 MG/DL (ref 120–199)
CHOLEST/HDLC SERPL: 4 {RATIO} (ref 2–5)
CLARITY UR: ABNORMAL
CO2 SERPL-SCNC: 25 MMOL/L (ref 23–29)
COLOR UR: YELLOW
CREAT SERPL-MCNC: 1.6 MG/DL (ref 0.5–1.4)
CV ECHO LV RWT: 0.53 CM
DIFFERENTIAL METHOD BLD: ABNORMAL
DOP CALC AO PEAK VEL: 1.3 M/S
DOP CALC AO VTI: 26.9 CM
DOP CALC LVOT AREA: 3.8 CM2
DOP CALC LVOT DIAMETER: 2.2 CM
DOP CALC LVOT PEAK VEL: 1.1 M/S
DOP CALC LVOT STROKE VOLUME: 87.8 CM3
DOP CALC MV VTI: 33 CM
DOP CALCLVOT PEAK VEL VTI: 23.1 CM
E WAVE DECELERATION TIME: 368.97 MSEC
E/A RATIO: 0.58
E/E' RATIO: 7.13 M/S
ECHO LV POSTERIOR WALL: 1 CM (ref 0.6–1.1)
EOSINOPHIL # BLD AUTO: 0 K/UL (ref 0–0.5)
EOSINOPHIL NFR BLD: 0.4 % (ref 0–8)
ERYTHROCYTE [DISTWIDTH] IN BLOOD BY AUTOMATED COUNT: 13.8 % (ref 11.5–14.5)
EST. GFR  (NO RACE VARIABLE): 32 ML/MIN/1.73 M^2
FRACTIONAL SHORTENING: 28.9 % (ref 28–44)
GLUCOSE SERPL-MCNC: 112 MG/DL (ref 70–110)
GLUCOSE UR QL STRIP: NEGATIVE
HCT VFR BLD AUTO: 32.9 % (ref 37–48.5)
HDLC SERPL-MCNC: 36 MG/DL (ref 40–75)
HDLC SERPL: 25 % (ref 20–50)
HGB BLD-MCNC: 11.1 G/DL (ref 12–16)
HGB UR QL STRIP: NEGATIVE
HYALINE CASTS #/AREA URNS LPF: 3 /LPF
IMM GRANULOCYTES # BLD AUTO: 0.02 K/UL (ref 0–0.04)
IMM GRANULOCYTES NFR BLD AUTO: 0.3 % (ref 0–0.5)
INR PPP: 1 (ref 0.8–1.2)
INTERVENTRICULAR SEPTUM: 1.1 CM (ref 0.6–1.1)
IVC DIAMETER: 1.56 CM
KETONES UR QL STRIP: NEGATIVE
LDLC SERPL CALC-MCNC: 97.8 MG/DL (ref 63–159)
LEFT ATRIUM AREA SYSTOLIC (APICAL 2 CHAMBER): 17.74 CM2
LEFT ATRIUM AREA SYSTOLIC (APICAL 4 CHAMBER): 19.19 CM2
LEFT ATRIUM VOLUME INDEX MOD: 24.7 ML/M2
LEFT ATRIUM VOLUME MOD: 48.37 ML
LEFT INTERNAL DIMENSION IN SYSTOLE: 2.7 CM (ref 2.1–4)
LEFT VENTRICLE DIASTOLIC VOLUME INDEX: 31.74 ML/M2
LEFT VENTRICLE DIASTOLIC VOLUME: 62.21 ML
LEFT VENTRICLE END DIASTOLIC VOLUME APICAL 2 CHAMBER: 54.47 ML
LEFT VENTRICLE END DIASTOLIC VOLUME APICAL 4 CHAMBER: 65.26 ML
LEFT VENTRICLE END SYSTOLIC VOLUME APICAL 2 CHAMBER: 46.42 ML
LEFT VENTRICLE END SYSTOLIC VOLUME APICAL 4 CHAMBER: 44.73 ML
LEFT VENTRICLE MASS INDEX: 64.2 G/M2
LEFT VENTRICLE SYSTOLIC VOLUME INDEX: 14 ML/M2
LEFT VENTRICLE SYSTOLIC VOLUME: 27.35 ML
LEFT VENTRICULAR INTERNAL DIMENSION IN DIASTOLE: 3.8 CM (ref 3.5–6)
LEFT VENTRICULAR MASS: 125.8 G
LEUKOCYTE ESTERASE UR QL STRIP: ABNORMAL
LV LATERAL E/E' RATIO: 7.13 M/S
LV SEPTAL E/E' RATIO: 7.13 M/S
LVED V (TEICH): 62.21 ML
LVES V (TEICH): 27.35 ML
LVOT MG: 2.23 MMHG
LVOT MV: 0.71 CM/S
LYMPHOCYTES # BLD AUTO: 1.9 K/UL (ref 1–4.8)
LYMPHOCYTES NFR BLD: 27.7 % (ref 18–48)
MAGNESIUM SERPL-MCNC: 1.9 MG/DL (ref 1.6–2.6)
MCH RBC QN AUTO: 29.4 PG (ref 27–31)
MCHC RBC AUTO-ENTMCNC: 33.7 G/DL (ref 32–36)
MCV RBC AUTO: 87 FL (ref 82–98)
MICROSCOPIC COMMENT: ABNORMAL
MONOCYTES # BLD AUTO: 0.5 K/UL (ref 0.3–1)
MONOCYTES NFR BLD: 7.4 % (ref 4–15)
MV MEAN GRADIENT: 1 MMHG
MV PEAK A VEL: 0.99 M/S
MV PEAK E VEL: 0.57 M/S
MV PEAK GRADIENT: 5 MMHG
MV STENOSIS PRESSURE HALF TIME: 107 MS
MV VALVE AREA BY CONTINUITY EQUATION: 2.66 CM2
MV VALVE AREA P 1/2 METHOD: 2.06 CM2
NEUTROPHILS # BLD AUTO: 4.4 K/UL (ref 1.8–7.7)
NEUTROPHILS NFR BLD: 63.9 % (ref 38–73)
NITRITE UR QL STRIP: NEGATIVE
NONHDLC SERPL-MCNC: 108 MG/DL
NRBC BLD-RTO: 0 /100 WBC
OHS CV RV/LV RATIO: 1.08 CM
OHS LV EJECTION FRACTION SIMPSONS BIPLANE MOD: 71 %
PH UR STRIP: 6 [PH] (ref 5–8)
PISA AR MAX VEL: 3.61 M/S
PISA TR MAX VEL: 2.44 M/S
PLATELET # BLD AUTO: 178 K/UL (ref 150–450)
PMV BLD AUTO: 9.5 FL (ref 9.2–12.9)
POTASSIUM SERPL-SCNC: 4.7 MMOL/L (ref 3.5–5.1)
PROT UR QL STRIP: NEGATIVE
PROTHROMBIN TIME: 10.9 SEC (ref 9–12.5)
RA PRESSURE ESTIMATED: 3 MMHG
RA VOL SYS: 43.31 ML
RBC # BLD AUTO: 3.77 M/UL (ref 4–5.4)
RBC #/AREA URNS HPF: 0 /HPF (ref 0–4)
RIGHT ATRIAL AREA: 15.6 CM2
RIGHT ATRIUM VOLUME AREA LENGTH APICAL 4 CHAMBER: 41.92 ML
RIGHT VENTRICLE DIASTOLIC BASEL DIMENSION: 4.1 CM
RV TB RVSP: 5 MMHG
RV TISSUE DOPPLER FREE WALL SYSTOLIC VELOCITY 1 (APICAL 4 CHAMBER VIEW): 7.81 CM/S
SINUS: 3.33 CM
SODIUM SERPL-SCNC: 139 MMOL/L (ref 136–145)
SP GR UR STRIP: 1.01 (ref 1–1.03)
SQUAMOUS #/AREA URNS HPF: 2 /HPF
STJ: 2.95 CM
T4 FREE SERPL-MCNC: 1.09 NG/DL (ref 0.71–1.51)
TDI LATERAL: 0.08 M/S
TDI SEPTAL: 0.08 M/S
TDI: 0.08 M/S
TR MAX PG: 24 MMHG
TRICUSPID ANNULAR PLANE SYSTOLIC EXCURSION: 1.79 CM
TRIGL SERPL-MCNC: 51 MG/DL (ref 30–150)
TROPONIN I SERPL DL<=0.01 NG/ML-MCNC: 0.02 NG/ML (ref 0–0.03)
TSH SERPL DL<=0.005 MIU/L-ACNC: 1.12 UIU/ML (ref 0.4–4)
TV REST PULMONARY ARTERY PRESSURE: 27 MMHG
URN SPEC COLLECT METH UR: ABNORMAL
UROBILINOGEN UR STRIP-ACNC: ABNORMAL EU/DL
WBC # BLD AUTO: 6.93 K/UL (ref 3.9–12.7)
WBC #/AREA URNS HPF: 22 /HPF (ref 0–5)
Z-SCORE OF LEFT VENTRICULAR DIMENSION IN END DIASTOLE: -3.9
Z-SCORE OF LEFT VENTRICULAR DIMENSION IN END SYSTOLE: -1.94

## 2025-01-02 PROCEDURE — G0378 HOSPITAL OBSERVATION PER HR: HCPCS

## 2025-01-02 PROCEDURE — 85610 PROTHROMBIN TIME: CPT | Performed by: FAMILY MEDICINE

## 2025-01-02 PROCEDURE — 93010 ELECTROCARDIOGRAM REPORT: CPT | Mod: ,,, | Performed by: INTERNAL MEDICINE

## 2025-01-02 PROCEDURE — 84484 ASSAY OF TROPONIN QUANT: CPT | Performed by: FAMILY MEDICINE

## 2025-01-02 PROCEDURE — 84439 ASSAY OF FREE THYROXINE: CPT | Performed by: FAMILY MEDICINE

## 2025-01-02 PROCEDURE — 81000 URINALYSIS NONAUTO W/SCOPE: CPT | Performed by: EMERGENCY MEDICINE

## 2025-01-02 PROCEDURE — 99214 OFFICE O/P EST MOD 30 MIN: CPT | Mod: FS,25,, | Performed by: INTERNAL MEDICINE

## 2025-01-02 PROCEDURE — 87186 SC STD MICRODIL/AGAR DIL: CPT | Performed by: EMERGENCY MEDICINE

## 2025-01-02 PROCEDURE — 85025 COMPLETE CBC W/AUTO DIFF WBC: CPT | Performed by: FAMILY MEDICINE

## 2025-01-02 PROCEDURE — 80061 LIPID PANEL: CPT | Performed by: FAMILY MEDICINE

## 2025-01-02 PROCEDURE — 87086 URINE CULTURE/COLONY COUNT: CPT | Performed by: EMERGENCY MEDICINE

## 2025-01-02 PROCEDURE — 87088 URINE BACTERIA CULTURE: CPT | Performed by: EMERGENCY MEDICINE

## 2025-01-02 PROCEDURE — 93005 ELECTROCARDIOGRAM TRACING: CPT

## 2025-01-02 PROCEDURE — 84443 ASSAY THYROID STIM HORMONE: CPT | Performed by: FAMILY MEDICINE

## 2025-01-02 PROCEDURE — 80048 BASIC METABOLIC PNL TOTAL CA: CPT | Performed by: FAMILY MEDICINE

## 2025-01-02 PROCEDURE — 25000003 PHARM REV CODE 250: Performed by: FAMILY MEDICINE

## 2025-01-02 RX ORDER — SODIUM CHLORIDE 0.9 % (FLUSH) 0.9 %
10 SYRINGE (ML) INJECTION EVERY 12 HOURS PRN
Status: DISCONTINUED | OUTPATIENT
Start: 2025-01-02 | End: 2025-01-02 | Stop reason: HOSPADM

## 2025-01-02 RX ORDER — IBUPROFEN 200 MG
24 TABLET ORAL
Status: DISCONTINUED | OUTPATIENT
Start: 2025-01-02 | End: 2025-01-02 | Stop reason: HOSPADM

## 2025-01-02 RX ORDER — DONEPEZIL HYDROCHLORIDE 5 MG/1
5 TABLET, FILM COATED ORAL NIGHTLY
Status: DISCONTINUED | OUTPATIENT
Start: 2025-01-02 | End: 2025-01-02

## 2025-01-02 RX ORDER — LIOTHYRONINE SODIUM 25 UG/1
25 TABLET ORAL DAILY
Status: DISCONTINUED | OUTPATIENT
Start: 2025-01-02 | End: 2025-01-02

## 2025-01-02 RX ORDER — AMLODIPINE BESYLATE 5 MG/1
5 TABLET ORAL DAILY
Status: DISCONTINUED | OUTPATIENT
Start: 2025-01-02 | End: 2025-01-02

## 2025-01-02 RX ORDER — LEVOFLOXACIN 750 MG/1
750 TABLET ORAL EVERY OTHER DAY
Qty: 1 TABLET | Refills: 0 | Status: SHIPPED | OUTPATIENT
Start: 2025-01-04 | End: 2025-01-04

## 2025-01-02 RX ORDER — ACETAMINOPHEN 325 MG/1
650 TABLET ORAL EVERY 6 HOURS PRN
Status: DISCONTINUED | OUTPATIENT
Start: 2025-01-02 | End: 2025-01-02 | Stop reason: HOSPADM

## 2025-01-02 RX ORDER — NALOXONE HCL 0.4 MG/ML
0.02 VIAL (ML) INJECTION
Status: DISCONTINUED | OUTPATIENT
Start: 2025-01-02 | End: 2025-01-02 | Stop reason: HOSPADM

## 2025-01-02 RX ORDER — PAROXETINE 10 MG/1
10 TABLET, FILM COATED ORAL EVERY MORNING
Status: DISCONTINUED | OUTPATIENT
Start: 2025-01-02 | End: 2025-01-02

## 2025-01-02 RX ORDER — ONDANSETRON HYDROCHLORIDE 2 MG/ML
4 INJECTION, SOLUTION INTRAVENOUS EVERY 8 HOURS PRN
Status: DISCONTINUED | OUTPATIENT
Start: 2025-01-02 | End: 2025-01-02 | Stop reason: HOSPADM

## 2025-01-02 RX ORDER — IBUPROFEN 200 MG
16 TABLET ORAL
Status: DISCONTINUED | OUTPATIENT
Start: 2025-01-02 | End: 2025-01-02 | Stop reason: HOSPADM

## 2025-01-02 RX ORDER — ENOXAPARIN SODIUM 100 MG/ML
40 INJECTION SUBCUTANEOUS EVERY 24 HOURS
Status: DISCONTINUED | OUTPATIENT
Start: 2025-01-02 | End: 2025-01-02

## 2025-01-02 RX ORDER — SODIUM CHLORIDE 9 MG/ML
INJECTION, SOLUTION INTRAVENOUS ONCE
Status: COMPLETED | OUTPATIENT
Start: 2025-01-02 | End: 2025-01-02

## 2025-01-02 RX ORDER — ACETAMINOPHEN 325 MG/1
650 TABLET ORAL EVERY 4 HOURS PRN
Status: DISCONTINUED | OUTPATIENT
Start: 2025-01-02 | End: 2025-01-02 | Stop reason: HOSPADM

## 2025-01-02 RX ORDER — VERAPAMIL HCL 240 MG
240 TABLET, EXTENDED RELEASE ORAL NIGHTLY
Status: DISCONTINUED | OUTPATIENT
Start: 2025-01-02 | End: 2025-01-02

## 2025-01-02 RX ORDER — IPRATROPIUM BROMIDE AND ALBUTEROL SULFATE 2.5; .5 MG/3ML; MG/3ML
3 SOLUTION RESPIRATORY (INHALATION) EVERY 6 HOURS PRN
Status: DISCONTINUED | OUTPATIENT
Start: 2025-01-02 | End: 2025-01-02 | Stop reason: HOSPADM

## 2025-01-02 RX ORDER — LEVOTHYROXINE SODIUM 50 UG/1
100 TABLET ORAL
Status: DISCONTINUED | OUTPATIENT
Start: 2025-01-02 | End: 2025-01-02 | Stop reason: HOSPADM

## 2025-01-02 RX ORDER — MELOXICAM 7.5 MG/1
7.5 TABLET ORAL DAILY
Status: DISCONTINUED | OUTPATIENT
Start: 2025-01-02 | End: 2025-01-02

## 2025-01-02 RX ORDER — SODIUM CHLORIDE 9 MG/ML
INJECTION, SOLUTION INTRAVENOUS CONTINUOUS
Status: DISCONTINUED | OUTPATIENT
Start: 2025-01-02 | End: 2025-01-02

## 2025-01-02 RX ORDER — ENOXAPARIN SODIUM 100 MG/ML
30 INJECTION SUBCUTANEOUS EVERY 24 HOURS
Status: DISCONTINUED | OUTPATIENT
Start: 2025-01-02 | End: 2025-01-02 | Stop reason: HOSPADM

## 2025-01-02 RX ORDER — LEVOFLOXACIN 750 MG/1
750 TABLET ORAL EVERY OTHER DAY
Status: DISCONTINUED | OUTPATIENT
Start: 2025-01-03 | End: 2025-01-02 | Stop reason: HOSPADM

## 2025-01-02 RX ORDER — TRIAMTERENE AND HYDROCHLOROTHIAZIDE 37.5; 25 MG/1; MG/1
1 CAPSULE ORAL DAILY PRN
Start: 2025-01-02

## 2025-01-02 RX ORDER — HEPARIN SODIUM 5000 [USP'U]/ML
5000 INJECTION, SOLUTION INTRAVENOUS; SUBCUTANEOUS EVERY 12 HOURS
Status: DISCONTINUED | OUTPATIENT
Start: 2025-01-02 | End: 2025-01-02

## 2025-01-02 RX ORDER — MEMANTINE HYDROCHLORIDE 5 MG/1
5 TABLET ORAL 2 TIMES DAILY
Status: DISCONTINUED | OUTPATIENT
Start: 2025-01-02 | End: 2025-01-02 | Stop reason: HOSPADM

## 2025-01-02 RX ORDER — GLUCAGON 1 MG
1 KIT INJECTION
Status: DISCONTINUED | OUTPATIENT
Start: 2025-01-02 | End: 2025-01-02 | Stop reason: HOSPADM

## 2025-01-02 RX ADMIN — SODIUM CHLORIDE: 0.9 INJECTION, SOLUTION INTRAVENOUS at 02:01

## 2025-01-02 RX ADMIN — MEMANTINE HYDROCHLORIDE 5 MG: 5 TABLET ORAL at 09:01

## 2025-01-02 RX ADMIN — SODIUM CHLORIDE: 9 INJECTION, SOLUTION INTRAVENOUS at 01:01

## 2025-01-02 RX ADMIN — LEVOTHYROXINE SODIUM 100 MCG: 0.05 TABLET ORAL at 07:01

## 2025-01-02 NOTE — ED NOTES
Received report from BRENT Sharif.    Patient lying in bed comfortably. Bed in lowest position. Call light remote within reach. Patient's daughter at the bedside.

## 2025-01-02 NOTE — ED PROVIDER NOTES
"Emergency Department Encounter  Provider Note  Encounter Date: 1/1/2025    Patient Name: Kike Bass  MRN: [id number]    History of Present Illness   HPI  History of Present Illness:    Chief Complaint:   Chief Complaint   Patient presents with    Pre Syncope     [facility] brought in a 83 y/o female from home with a near syncope episode.  " While sitting on the couch  after receiving melatonin, pt had a moment of starring off into space, not responding to verbal stimuli and soiling herself (both urine and stool).  Upon EMS  arrival pt was back to her baseline".  Accu check 130, left AC 18 G IV inserted.     84-year-old female presenting with dizziness when standing up from sitting, altered mental status and swelling herself.  Eventually came back to normal after many minutes.  This has never happened before, but earlier in the day when she stood up she felt dizzy.  Denies any chest pain.  With a history of dementia, does not remember what happened today.  Currently back to baseline.    The following PMH/PSH/SocHx/FamHx has been reviewed by myself:  Past Medical History:   Diagnosis Date    Disorder of kidney and ureter     Hypertension     Memory loss     Pulmonary embolism     Rheumatoid arthritis, unspecified     Shingles      Past Surgical History:   Procedure Laterality Date    CHOLECYSTECTOMY       Social History     Tobacco Use    Smoking status: Former     Types: Cigarettes    Smokeless tobacco: Never   Substance Use Topics    Alcohol use: Not Currently    Drug use: Never     Family History   Problem Relation Name Age of Onset    Diabetes Mother      Liver disease Father      Heart disease Father      Cancer Sister      Diabetes Brother      Dementia Brother      Cancer Brother       Allergies reviewed:  Review of patient's allergies indicates:   Allergen Reactions    Codeine      Other reaction(s): Shortness of breath, Swelling of throat    Eggs [egg derived]      Other reaction(s): Shortness of " breath  ALLERGY TO EGGS IF NOT THOROUGHLY COOKED    Penicillin g benzathine      Other reaction(s): Swelling of throat     Medications reviewed:  Medication List with Changes/Refills   Current Medications    ACETAMINOPHEN (TYLENOL) 325 MG TABLET    Take 325 mg by mouth every 6 (six) hours as needed for Pain.    AMLODIPINE (NORVASC) 5 MG TABLET    Take 5 mg by mouth once daily.    DONEPEZIL (ARICEPT) 5 MG TABLET    Take 5 mg by mouth every evening.    IPRATROPIUM BROMIDE NASL    by Nasal route.    LEVOTHYROXINE (SYNTHROID) 100 MCG TABLET    Take 100 mcg by mouth before breakfast.    LINACLOTIDE (LINZESS) 145 MCG CAP CAPSULE    Take 145 mcg by mouth before breakfast.    LIOTHYRONINE (CYTOMEL) 5 MCG TAB    Take 25 mcg by mouth once daily.    MELOXICAM (MOBIC) 7.5 MG TABLET    Take 7.5 mg by mouth once daily.    MEMANTINE (NAMENDA) 5 MG TAB    Take 5 mg by mouth 2 (two) times daily.    NAPROXEN (NAPROSYN) 500 MG TABLET    Take 500 mg by mouth 2 (two) times daily with meals.    PAROXETINE (PAXIL) 10 MG TABLET    Take 10 mg by mouth every morning.    TRIAMTERENE-HYDROCHLOROTHIAZIDE 37.5-25 MG (DYAZIDE) 37.5-25 MG PER CAPSULE    Take 1 capsule by mouth every morning.    VERAPAMIL (CALAN-SR) 240 MG CR TABLET    Take 240 mg by mouth every evening.       Physical Exam     Initial Vitals [01/01/25 2145]   BP Pulse Resp Temp SpO2   138/60 (!) 50 18 97.8 °F (36.6 °C) 100 %      MAP       --           Triage vital signs reviewed.    Constitutional: Well-nourished, well-developed. Not in acute distress.  HENT: Normocephalic, atraumatic. Moist mucous membranes.  Eyes: No conjunctival injection.  Resp: Normal respiratory effort, breathing unlabored.  Cardio:  Bradycardic rate and rhythm.  GI: Abdomen non-distended.   MSK: Extremities without any deformities noted. No lower extremity edema.  Skin: Warm and dry. No rashes or lesions noted.  Neuro: Awake and alert. Moves all extremities.  Cranial nerves 2-12 grossly intact, strength  equal 5/5 bilateral upper and lower extremities.  No focal deficits.    ED Course   Procedures    Medical Decision Making    History Acquisition     The history is provided by the patient.   Assessment requiring an independent historian and why historian was needed:  Daughter at bedside, patient has dementia    Review of prior external/non ED notes:  Seen by Neurology 2022 for Alzheimer's    Differential Diagnoses   Based on available information and initial assessment, the working Differential Diagnosis includes, but is not limited to:  CVA/TIA, seizure, status epilepticus, post-ictal state, meningitis/encephalitis, sepsis, MI/ACS, arrhythmia, syncope, intracranial mass/hemorrhage, head trauma, anaphylaxis, substance abuse, alcohol intoxication/withdrawal, medication reaction, intentional medication overdose, neuroleptic malignant syndrome, serotonin syndrome, CO poisoning, hypoxia/hypercapnea, hepatic encephalopathy, metabolic disturbance, thyroid disease, hypoglycemia.    EKG   EKG ordered and independently reviewed by me:   Sinus bradycardia with first-degree AV block, rate 47, normal axis, no STEMI    Labs   Lab tests ordered and independently reviewed by me:    Labs Reviewed   CBC W/ AUTO DIFFERENTIAL - Abnormal       Result Value    WBC 7.11      RBC 3.84 (*)     Hemoglobin 11.4 (*)     Hematocrit 33.6 (*)     MCV 88      MCH 29.7      MCHC 33.9      RDW 13.8      Platelets 172      MPV 11.2      Immature Granulocytes 0.4      Gran # (ANC) 3.7      Immature Grans (Abs) 0.03      Lymph # 2.5      Mono # 0.7      Eos # 0.1      Baso # 0.03      nRBC 0      Gran % 52.4      Lymph % 35.0      Mono % 10.0      Eosinophil % 1.8      Basophil % 0.4      Differential Method Automated     COMPREHENSIVE METABOLIC PANEL - Abnormal    Sodium 140      Potassium 3.9      Chloride 106      CO2 22 (*)     Glucose 119 (*)     BUN 24 (*)     Creatinine 1.8 (*)     Calcium 8.1 (*)     Total Protein 6.6      Albumin 2.8 (*)      Total Bilirubin 0.2      Alkaline Phosphatase 86      AST 25      ALT 16      eGFR 28 (*)     Anion Gap 12     TROPONIN I    Troponin I 0.015     MAGNESIUM   MAGNESIUM    Magnesium 1.9     URINALYSIS, REFLEX TO URINE CULTURE       Imaging   Imaging ordered and independently reviewed by me:   Imaging Results              CT Head Without Contrast (Final result)  Result time 01/01/25 23:08:21      Final result by Eulalio Quarles MD (01/01/25 23:08:21)                   Impression:      1.  No acute intracranial process.  Further evaluation with MRI of the brain may be obtained, as clinically warranted.    2.  Changes of chronic vessel ischemic disease and cerebral volume loss.      Electronically signed by: Eulalio Quarles MD  Date:    01/01/2025  Time:    23:08               Narrative:    EXAMINATION:  CT HEAD WITHOUT CONTRAST    CLINICAL HISTORY:  Mental status change, unknown cause;    TECHNIQUE:  Low dose axial images were obtained through the head.  Coronal and sagittal reformations were also performed. Contrast was not administered.    COMPARISON:  None.    FINDINGS:  The subcutaneous tissues are unremarkable.  The bony calvarium is intact.  The paranasal sinuses are unremarkable.  There are small bilateral mastoid effusions.  There are postoperative changes in the left globe.    The craniocervical junction is intact.  There is partial empty sella configuration.  There is no evidence of intracranial hemorrhage.  There are no extra-axial fluid collections.    The ventricles and sulci are prominent, consistent cerebral volume loss.  There are calcifications in the globus pallidus.  There are hypodensities within the periventricular and subcortical white matter.  There are old lacunar type infarctions in the thalami.  The gray-white differentiation is maintained.  There is no dense vessel sign.  There is no evidence of mass effect.                                         Additional Consideration   Kike Bass   presents to the Emergency Department today with intermittent altered mental status.  Patient is bradycardic on exam.  Concern for TIA versus cardiac event versus symptomatic bradycardia.  Will admit.    Additional testing considered but not indicated during the course of this workup: further imaging and labwork, not indicated  Co-morbidities/chronic illness/exacerbation of chronic illness considered which impacted clinical decision making:  Dementia, hypertension  Procedures done in the ED or plan for the OR: No  Social determinants of care considered during development of treatment plan include: Decreased medical literacy  Discussion of management or test interpretation with external provider: Yes, describe:  See ED course  DNR discussion: No    The patient's list of active medications and allergies as documented per RN staff has been reviewed.  Medications given in the ED and/or prescribed: Medications - No data to display          ED Course as of 01/02/25 0049   Thu Jan 02, 2025 0049 CBC auto differential(!)  Independently interpreted by me; unremarkable or consistent with the patient's baseline   [CS]   0049 Comprehensive metabolic panel(!)  Slight CEM [CS]   0049 Troponin I: 0.015 [CS]   0049 Magnesium : 1.9 [CS]   0049 CT Head Without Contrast  No acute findings [CS]   0049 Given age and risk factors, will admit.  Sign-out given to Dr. Hamm [CS]      ED Course User Index  [CS] Mary Mitchell MD       Explanation of disposition: Admit    Clinical Impression:     1. AMS (altered mental status)             Mary Mitchell MD  01/02/25 0049

## 2025-01-02 NOTE — ED NOTES
"Pt presents to the ED bib for a near syncope episode witnessed for daughter. Daughter states the mom "went out for 30 secs staring into space and then soiled herself which is unusual for her". Pt has a hx of dementia. Oriented to person and time. Pt denies chest pain or SOB. HR 45-50- MD aware. Pt unaware of situation that occurred at home. Resps even and unlabored. Lungs clear bilaterally. NAD noted. No wounds noted. Pt changed out of her clothes d/t soiling her clothes. Pt was stood up with assistance to take off clothes and stated "I am dizzy". Dizziness resolved when she laid back down in bed. Monitor in place. Daughter at pt's bedside.   "

## 2025-01-02 NOTE — HOSPITAL COURSE
"Mr. Bass presents following episode of syncope.  Found to have CEM and bradycardia.  Suspect that this is likely secondary to orthostasis/dehydration as patient has not been eating or drinking.  Given gentle IV fluids with some improvement and negative orthostatic vitals.  Holding home amlodipine and verapamil and will change Dyazide to p.r.n. dosing as patient's daughter is concerned about her intermittent lower extremity swelling.  The swelling could be secondary to her calcium channel blockers and so I think a trial off of them makes sense at this juncture.  This patient should have a loosened blood pressure goal anyway given elderly age with advanced dementia and would avoid AV blockers given bradycardia. Holding donepezil for this reason as well.  Stable for discharge at this time and can follow-up with PCP for BP medication titration.    BP (!) 142/63   Pulse 60   Temp 97.8 °F (36.6 °C)   Resp (!) 21   Ht 5' 6" (1.676 m)   Wt 86.2 kg (190 lb)   SpO2 98%   BMI 30.67 kg/m²   Physical Exam  HENT:      Head: Normocephalic and atraumatic.      Nose: Nose normal.      Mouth/Throat:      Mouth: Mucous membranes are moist.   Eyes:      Extraocular Movements: Extraocular movements intact.      Pupils: Pupils are equal, round, and reactive to light.   Cardiovascular:      Rate and Rhythm: Regular rhythm.   Pulmonary:      Breath sounds: Normal breath sounds.   Abdominal:      Palpations: Abdomen is soft.   Musculoskeletal:         General: Normal range of motion.      Cervical back: Neck supple.   Skin:     General: Skin is warm.   Neurological:      General: No focal deficit present.      Mental Status: She is alert. She is disoriented.   Psychiatric:         Mood and Affect: Mood normal.      CRANIAL NERVES      CN III, IV, VI   Pupils are equal, round, and reactive to light.  "

## 2025-01-02 NOTE — ASSESSMENT & PLAN NOTE
Patient's blood pressure range in the last 24 hours was: BP  Min: 114/55  Max: 160/65.The patient's inpatient anti-hypertensive regimen is listed below:  Current Antihypertensives       Plan  - BP is controlled, no changes needed to their regimen  - Patient self discontinued all medications UNK months ago. If BP elevated, consider resumption of low dose norvasc. Would not recommend resumption of Dyazide with decreased p.o. intake. Hold Verapamil.

## 2025-01-02 NOTE — HPI
HPI retrieved from chart and family at bedside. Patient with dementia and does not recall events. Kike Bass is a 82 y.o. female with hypertension, hypothyroidism, Alzheimer's dementia, pulmonary embolism who took her self off anticoagulation/ all medications. She is here visiting her daughter who cares for her 1 weekend per month. Patient presented to the ED after her daughter noted her to have worsening AMS and possible near syncope. At baseline, she is able to feed herself but is reported to have poor p.o. intake.  As per family they lowered her to the floor and then patient became more alert. Patient was seen in the ED and she had a CT scan of the head which showed no acute abnormality. Patient also was found to have a heart rate around the 50s. Patient is now back to her baseline. No focal deficits noted. HR 50s-60s SB without acute ischemic changes. Troponin negative x 3.

## 2025-01-02 NOTE — ASSESSMENT & PLAN NOTE
Patient's blood pressure range in the last 24 hours was: BP  Min: 123/56  Max: 160/65.The patient's inpatient anti-hypertensive regimen is listed below:  Current Antihypertensives       Plan  - BP is controlled, no changes needed to their regimen  - monitor blood pressure closely.

## 2025-01-02 NOTE — SUBJECTIVE & OBJECTIVE
Past Medical History:   Diagnosis Date    Disorder of kidney and ureter     Hypertension     Memory loss     Pulmonary embolism     Rheumatoid arthritis, unspecified     Shingles        Past Surgical History:   Procedure Laterality Date    CHOLECYSTECTOMY         Review of patient's allergies indicates:   Allergen Reactions    Codeine      Other reaction(s): Shortness of breath, Swelling of throat    Eggs [egg derived]      Other reaction(s): Shortness of breath  ALLERGY TO EGGS IF NOT THOROUGHLY COOKED    Penicillin g benzathine      Other reaction(s): Swelling of throat       No current facility-administered medications on file prior to encounter.     Current Outpatient Medications on File Prior to Encounter   Medication Sig    acetaminophen (TYLENOL) 325 MG tablet Take 325 mg by mouth every 6 (six) hours as needed for Pain.    amLODIPine (NORVASC) 5 MG tablet Take 5 mg by mouth once daily.    donepeziL (ARICEPT) 5 MG tablet Take 5 mg by mouth every evening.    IPRATROPIUM BROMIDE NASL by Nasal route.    levothyroxine (SYNTHROID) 100 MCG tablet Take 100 mcg by mouth before breakfast.    linaCLOtide (LINZESS) 145 mcg Cap capsule Take 145 mcg by mouth before breakfast.    liothyronine (CYTOMEL) 5 MCG Tab Take 25 mcg by mouth once daily.    meloxicam (MOBIC) 7.5 MG tablet Take 7.5 mg by mouth once daily.    memantine (NAMENDA) 5 MG Tab Take 5 mg by mouth 2 (two) times daily.    naproxen (NAPROSYN) 500 MG tablet Take 500 mg by mouth 2 (two) times daily with meals.    paroxetine (PAXIL) 10 MG tablet Take 10 mg by mouth every morning.    triamterene-hydrochlorothiazide 37.5-25 mg (DYAZIDE) 37.5-25 mg per capsule Take 1 capsule by mouth every morning.    verapamiL (CALAN-SR) 240 MG CR tablet Take 240 mg by mouth every evening.     Family History       Problem Relation (Age of Onset)    Cancer Sister, Brother    Dementia Brother    Diabetes Mother, Brother    Heart disease Father    Liver disease Father          Tobacco  Use    Smoking status: Former     Types: Cigarettes    Smokeless tobacco: Never   Substance and Sexual Activity    Alcohol use: Not Currently    Drug use: Never    Sexual activity: Not on file     Review of Systems   Unable to perform ROS: Dementia     Objective:     Vital Signs (Most Recent):  Temp: 97.8 °F (36.6 °C) (01/01/25 2145)  Pulse: 60 (01/02/25 0754)  Resp: 17 (01/02/25 0754)  BP: (!) 114/55 (01/02/25 0725)  SpO2: 100 % (01/02/25 0754) Vital Signs (24h Range):  Temp:  [97.8 °F (36.6 °C)] 97.8 °F (36.6 °C)  Pulse:  [47-60] 60  Resp:  [12-18] 17  SpO2:  [96 %-100 %] 100 %  BP: (114-160)/(55-67) 114/55     Weight: 86.2 kg (190 lb)  Body mass index is 30.67 kg/m².    SpO2: 100 %         Intake/Output Summary (Last 24 hours) at 1/2/2025 0949  Last data filed at 1/2/2025 0226  Gross per 24 hour   Intake 43.29 ml   Output --   Net 43.29 ml       Lines/Drains/Airways       Peripheral Intravenous Line  Duration                  Peripheral IV - Single Lumen 18 G Left Antecubital -- days                     Physical Exam  Constitutional:       General: She is not in acute distress.     Appearance: She is not diaphoretic.   HENT:      Head: Atraumatic.   Eyes:      General:         Right eye: No discharge.         Left eye: No discharge.   Cardiovascular:      Rate and Rhythm: Normal rate and regular rhythm.   Pulmonary:      Effort: Pulmonary effort is normal.      Breath sounds: Normal breath sounds.   Abdominal:      General: Bowel sounds are normal.      Palpations: Abdomen is soft.   Musculoskeletal:      Right lower leg: No edema.      Left lower leg: No edema.   Skin:     General: Skin is warm and dry.   Neurological:      Mental Status: She is alert and oriented to person, place, and time.          Significant Labs: BMP:   Recent Labs   Lab 01/01/25  2213 01/02/25  0347   * 112*    139   K 3.9 4.7    105   CO2 22* 25   BUN 24* 24*   CREATININE 1.8* 1.6*   CALCIUM 8.1* 8.3*   MG 1.9  --    ,  "CMP   Recent Labs   Lab 01/01/25 2213 01/02/25  0347    139   K 3.9 4.7    105   CO2 22* 25   * 112*   BUN 24* 24*   CREATININE 1.8* 1.6*   CALCIUM 8.1* 8.3*   PROT 6.6  --    ALBUMIN 2.8*  --    BILITOT 0.2  --    ALKPHOS 86  --    AST 25  --    ALT 16  --    ANIONGAP 12 9   , CBC   Recent Labs   Lab 01/01/25 2213 01/02/25  0347   WBC 7.11 6.93   HGB 11.4* 11.1*   HCT 33.6* 32.9*    178   , INR   Recent Labs   Lab 01/02/25  0347   INR 1.0   , Lipid Panel   Recent Labs   Lab 01/02/25  0347   CHOL 144   HDL 36*   LDLCALC 97.8   TRIG 51   CHOLHDL 25.0   , Troponin No results for input(s): "TROPONINIHS" in the last 48 hours., and All pertinent lab results from the last 24 hours have been reviewed.    Significant Imaging: Echocardiogram: Transthoracic echo (TTE) complete (Cupid Only):   Results for orders placed or performed during the hospital encounter of 01/01/25   Echo   Result Value Ref Range    BSA 2 m2    Byrd's Biplane MOD Ejection Fraction 71 %    A2C EF 63 %    A4C EF 77 %    LVOT stroke volume 87.8 cm3    LVIDd 3.8 3.5 - 6.0 cm    LV Systolic Volume 27.35 mL    LV Systolic Volume Index 14.0 mL/m2    LVIDs 2.7 2.1 - 4.0 cm    LV ESV A2C 46.42 mL    LV Diastolic Volume 62.21 mL    LV ESV A4C 44.73 mL    LV Diastolic Volume Index 31.74 mL/m2    LV EDV A2C [id number] mL    LV EDV A4C 65.26 mL    Left Ventricular End Systolic Volume by Teichholz Method 27.35 mL    Left Ventricular End Diastolic Volume by Teichholz Method 62.21 mL    IVS 1.1 0.6 - 1.1 cm    LVOT diameter 2.2 cm    LVOT area 3.8 cm2    FS 28.9 28 - 44 %    Left Ventricle Relative Wall Thickness 0.53 cm    PW 1.0 0.6 - 1.1 cm    LV mass 125.8 g    LV Mass Index 64.2 g/m2    MV Peak E Quinton 0.57 m/s    TDI LATERAL 0.08 m/s    TDI SEPTAL 0.08 m/s    E/E' ratio 7.13 m/s    MV Peak A Quinton 0.99 m/s    TR Max Quinton 2.44 m/s    E/A ratio 0.58     E wave deceleration time 368.97 msec    LV SEPTAL E/E' RATIO 7.13 m/s    LV " LATERAL E/E' RATIO 7.13 m/s    LVOT peak mauricio 1.1 m/s    Left Ventricular Outflow Tract Mean Velocity 0.71 cm/s    Left Ventricular Outflow Tract Mean Gradient 2.23 mmHg    RV- haile basal diam 4.1 cm    RV S' 7.81 cm/s    TAPSE 1.79 cm    RV/LV Ratio 1.08 cm    LA Vol (MOD) 48.37 mL    SARA (MOD) 24.7 mL/m2    RA area length vol 41.92 mL    RA Area 15.6 cm2    RA Vol 43.31 mL    AV regurgitation pressure 1/2 time [id number] ms    AR Max Mauricio 3.61 m/s    AV mean gradient 3.4 mmHg    AV peak gradient 6.8 mmHg    Ao peak mauricio 1.3 m/s    Ao VTI 26.9 cm    LVOT peak VTI 23.1 cm    AV valve area 3.3 cm²    AV Velocity Ratio 0.85     AV index (prosthetic) 0.86     SINDY by Velocity Ratio 3.2 cm²    MV mean gradient 1 mmHg    MV peak gradient 5 mmHg    MV stenosis pressure 1/2 time 107.00 ms    MV valve area p 1/2 method 2.06 cm2    MV valve area by continuity eq 2.66 cm2    MV VTI 33.0 cm    Triscuspid Valve Regurgitation Peak Gradient 24 mmHg    Sinus 3.33 cm    STJ 2.95 cm    Ascending aorta 2.90 cm    IVC diameter 1.56 cm    Mean e' 0.08 m/s    ZLVIDS -1.94     ZLVIDD -3.90     LA area A4C 19.19 cm2    LA area A2C 17.74 cm2

## 2025-01-02 NOTE — HPI
Kike Bass is a 82 y.o. female with a past medical history of hypertension, hypothyroidism, Alzheimer's dementia, pulmonary embolism who took her self off anticoagulation presents to emergency department due to patient having an episode where she became unresponsive and nearly passed out.  As per family they lowered her to the floor and then patient became more alert.  Patient was seen in the ED and she had a CT scan of the head which showed no acute abnormality. Patient also was found to have a heart rate around the 50s.  Due to patient's presenting symptoms she will require admission for further evaluation and management.  Patient is a poor historian and most of the history is obtained from her daughter.  At time of my examination patient denied any headache, fever, chills, chest pain, shortness of breath but complained of having dizziness for several days.

## 2025-01-02 NOTE — ED NOTES
Orthostatic VS    Lying-   136/65 BP  52 HR  99% on RA O2    Sitting-   127/60 BP  52 HR  100 O2    Standing-  120/58 BP  52 HR  100 O2

## 2025-01-02 NOTE — ED NOTES
Orthostatic VS  07:21 - 139/63 (non-symptomatic)  07:23 - 132/64 (non-symptomatic)  0725 - 114/55 (non-symptomatic)

## 2025-01-02 NOTE — H&P
"[facility] Medicine  History & Physical    Patient Name: Kike Bass  MRN: [id number]  Patient Class: OP- Observation  Admission Date: 1/1/2025  Attending Physician: Tracy Ortiz MD   Primary Care Provider: Baldomero Johns MD         Patient information was obtained from patient, relative(s), past medical records, and ER records.     Subjective:     Principal Problem:AMS (altered mental status)    Chief Complaint:   Chief Complaint   Patient presents with    Pre Syncope     EJ-[facility] brought in a 83 y/o female from home with a near syncope episode.  " While sitting on the couch  after receiving melatonin, pt had a moment of starring off into space, not responding to verbal stimuli and soiling herself (both urine and stool).  Upon EMS  arrival pt was back to her baseline".  Accu check 130, left AC 18 G IV inserted.        HPI: iKke Bass is a 82 y.o. female with a past medical history of hypertension, hypothyroidism, Alzheimer's dementia, pulmonary embolism who took her self off anticoagulation presents to emergency department due to patient having an episode where she became unresponsive and nearly passed out.  As per family they lowered her to the floor and then patient became more alert.  Patient was seen in the ED and she had a CT scan of the head which showed no acute abnormality. Patient also was found to have a heart rate around the 50s.  Due to patient's presenting symptoms she will require admission for further evaluation and management.  Patient is a poor historian and most of the history is obtained from her daughter.  At time of my examination patient denied any headache, fever, chills, chest pain, shortness of breath but complained of having dizziness for several days.    Past Medical History:   Diagnosis Date    Disorder of kidney and ureter     Hypertension     Memory loss     Pulmonary embolism     Rheumatoid arthritis, unspecified     Shingles  "       Past Surgical History:   Procedure Laterality Date    CHOLECYSTECTOMY         Review of patient's allergies indicates:   Allergen Reactions    Codeine      Other reaction(s): Shortness of breath, Swelling of throat    Eggs [egg derived]      Other reaction(s): Shortness of breath  ALLERGY TO EGGS IF NOT THOROUGHLY COOKED    Penicillin g benzathine      Other reaction(s): Swelling of throat       No current facility-administered medications on file prior to encounter.     Current Outpatient Medications on File Prior to Encounter   Medication Sig    acetaminophen (TYLENOL) 325 MG tablet Take 325 mg by mouth every 6 (six) hours as needed for Pain.    amLODIPine (NORVASC) 5 MG tablet Take 5 mg by mouth once daily.    donepeziL (ARICEPT) 5 MG tablet Take 5 mg by mouth every evening.    IPRATROPIUM BROMIDE NASL by Nasal route.    levothyroxine (SYNTHROID) 100 MCG tablet Take 100 mcg by mouth before breakfast.    linaCLOtide (LINZESS) 145 mcg Cap capsule Take 145 mcg by mouth before breakfast.    liothyronine (CYTOMEL) 5 MCG Tab Take 25 mcg by mouth once daily.    meloxicam (MOBIC) 7.5 MG tablet Take 7.5 mg by mouth once daily.    memantine (NAMENDA) 5 MG Tab Take 5 mg by mouth 2 (two) times daily.    naproxen (NAPROSYN) 500 MG tablet Take 500 mg by mouth 2 (two) times daily with meals.    paroxetine (PAXIL) 10 MG tablet Take 10 mg by mouth every morning.    triamterene-hydrochlorothiazide 37.5-25 mg (DYAZIDE) 37.5-25 mg per capsule Take 1 capsule by mouth every morning.    verapamiL (CALAN-SR) 240 MG CR tablet Take 240 mg by mouth every evening.     Family History       Problem Relation (Age of Onset)    Cancer Sister, Brother    Dementia Brother    Diabetes Mother, Brother    Heart disease Father    Liver disease Father          Tobacco Use    Smoking status: Former     Types: Cigarettes    Smokeless tobacco: Never   Substance and Sexual Activity    Alcohol use: Not Currently    Drug use: Never    Sexual activity:  Not on file     Review of Systems   Constitutional:  Positive for activity change.   HENT: Negative.     Eyes: Negative.    Respiratory: Negative.     Cardiovascular: Negative.    Gastrointestinal: Negative.    Musculoskeletal: Negative.    Skin: Negative.    Neurological:  Positive for dizziness and syncope.   Psychiatric/Behavioral:  Positive for confusion.      Objective:     Vital Signs (Most Recent):  Temp: 97.8 °F (36.6 °C) (01/01/25 2145)  Pulse: (!) 52 (01/02/25 0102)  Resp: 15 (01/02/25 0102)  BP: (!) 123/56 (01/02/25 0102)  SpO2: 98 % (01/02/25 0102) Vital Signs (24h Range):  Temp:  [97.8 °F (36.6 °C)] 97.8 °F (36.6 °C)  Pulse:  [47-52] 52  Resp:  [12-18] 15  SpO2:  [98 %-100 %] 98 %  BP: (123-160)/(56-67) 123/56        There is no height or weight on file to calculate BMI.     Physical Exam  HENT:      Head: Normocephalic and atraumatic.      Nose: Nose normal.      Mouth/Throat:      Mouth: Mucous membranes are moist.   Eyes:      Extraocular Movements: Extraocular movements intact.      Pupils: Pupils are equal, round, and reactive to light.   Cardiovascular:      Rate and Rhythm: Regular rhythm.   Pulmonary:      Breath sounds: Normal breath sounds.   Abdominal:      Palpations: Abdomen is soft.   Musculoskeletal:         General: Normal range of motion.      Cervical back: Neck supple.   Skin:     General: Skin is warm.   Neurological:      General: No focal deficit present.      Mental Status: She is alert. She is disoriented.   Psychiatric:         Mood and Affect: Mood normal.              CRANIAL NERVES     CN III, IV, VI   Pupils are equal, round, and reactive to light.       Significant Labs: All pertinent labs within the past 24 hours have been reviewed.  Recent Lab Results         01/01/25  2213        Albumin 2.8       ALP 86       ALT 16       Anion Gap 12       AST 25       Baso # 0.03       Basophil % 0.4       BILIRUBIN TOTAL 0.2  Comment: For infants and newborns, interpretation of  results should be based  on gestational age, weight and in agreement with clinical  observations.    Premature Infant recommended reference ranges:  Up to 24 hours.............<8.0 mg/dL  Up to 48 hours............<12.0 mg/dL  3-5 days..................<15.0 mg/dL  6-29 days.................<15.0 mg/dL         BUN 24       Calcium 8.1       Chloride 106       CO2 22       Creatinine 1.8       Differential Method Automated       eGFR 28       Eos # 0.1       Eos % 1.8       Glucose 119       Gran # (ANC) 3.7       Gran % 52.4       Hematocrit 33.6       Hemoglobin 11.4       Immature Grans (Abs) 0.03  Comment: Mild elevation in immature granulocytes is non specific and   can be seen in a variety of conditions including stress response,   acute inflammation, trauma and pregnancy. Correlation with other   laboratory and clinical findings is essential.         Immature Granulocytes 0.4       Lymph # 2.5       Lymph % 35.0       Magnesium  1.9       MCH 29.7       MCHC 33.9       MCV 88       Mono # 0.7       Mono % 10.0       MPV 11.2       nRBC 0       Platelet Count 172       Potassium 3.9       PROTEIN TOTAL 6.6       RBC 3.84       RDW 13.8       Sodium 140       Troponin I 0.015  Comment: The reference interval for Troponin I represents the 99th percentile   cutoff   for our facility and is consistent with 3rd generation assay   performance.         WBC 7.11               Significant Imaging: I have reviewed all pertinent imaging results/findings within the past 24 hours.  Assessment/Plan:     * AMS (altered mental status)    Admit to medical floor with telemetry.  Neuro checks.  MRI of the brain.  Inpatient Neurology consultation    Near syncope    Orthostatic blood pressure and pulse.  Echocardiogram.  IV fluids.    Bradycardia  Repeat EKG.  Inpatient Cardiology consultation.    HTN (hypertension)  Patient's blood pressure range in the last 24 hours was: BP  Min: 123/56  Max: 160/65.The patient's inpatient  anti-hypertensive regimen is listed below:  Current Antihypertensives       Plan  - BP is controlled, no changes needed to their regimen  - monitor blood pressure closely.    Dementia  Patient with dementia with likely etiology of alzheimer's dementia. Dementia is moderate. The patient does not have signs of behavioral disturbance. Home dementia medications are Held or Continued: continued.. Continue non-pharmacologic interventions to prevent delirium (No VS between 11PM-5AM, activity during day, opening blinds, providing glasses/hearing aids, and up in chair during daytime). Will avoid narcotics and benzos unless absolutely necessary. PRN anti-psychotics are not prescribed to avoid self harm behaviors.    Hypothyroidism    Continue home medication.    CEM (acute kidney injury)  CEM is likely due to pre-renal azotemia due to intravascular volume depletion. Baseline creatinine is  1.39 . Most recent creatinine and eGFR are listed below.  Recent Labs     01/01/25 2213   CREATININE 1.8*   EGFRNORACEVR 28*      Plan  - CEM is worsening. Will adjust treatment as follows: IV fluids  - Avoid nephrotoxins and renally dose meds for GFR listed above  - Monitor urine output, serial BMP, and adjust therapy as needed  - monitor renal function closely.      VTE Risk Mitigation (From admission, onward)           Ordered     heparin (porcine) injection 5,000 Units  Every 12 hours         01/02/25 0124     IP VTE HIGH RISK PATIENT  Once         01/02/25 0124     Place sequential compression device  Until discontinued         01/02/25 0124                       On 01/02/2025, patient should be placed in hospital observation services under my care.             Lewis Hamm MD  Department of Hospital Medicine  [place] - Emergency Dept

## 2025-01-02 NOTE — DISCHARGE SUMMARY
[facility]  [facility] Medicine  Discharge Summary      Patient Name: Kike Bass  MRN: [id number]  ZEE: [id number]  Patient Class: OP- Observation  Admission Date: 1/1/2025  Hospital Length of Stay: 0 days  Discharge Date and Time:  01/02/2025 2:51 PM  Attending Physician: Alek Bedoya,*   Discharging Provider: Alek Bedoya MD  Primary Care Provider: No primary care provider on file.    Primary Care Team: Networked reference to record PCT     HPI:   Kike Bass is a 82 y.o. female with a past medical history of hypertension, hypothyroidism, Alzheimer's dementia, pulmonary embolism who took her self off anticoagulation presents to emergency department due to patient having an episode where she became unresponsive and nearly passed out.  As per family they lowered her to the floor and then patient became more alert.  Patient was seen in the ED and she had a CT scan of the head which showed no acute abnormality. Patient also was found to have a heart rate around the 50s.  Due to patient's presenting symptoms she will require admission for further evaluation and management.  Patient is a poor historian and most of the history is obtained from her daughter.  At time of my examination patient denied any headache, fever, chills, chest pain, shortness of breath but complained of having dizziness for several days.    * No surgery found *      Hospital Course:   Mr. Bass presents following episode of syncope.  Found to have CEM and bradycardia.  Suspect that this is likely secondary to orthostasis/dehydration as patient has not been eating or drinking.  Given gentle IV fluids with some improvement and negative orthostatic vitals.  Holding home amlodipine and verapamil and will change Dyazide to p.r.n. dosing as patient's daughter is concerned about her intermittent lower extremity swelling.  The swelling could be secondary to her calcium channel blockers and so I think  "a trial off of them makes sense at this juncture.  This patient should have a loosened blood pressure goal anyway given elderly age with advanced dementia and would avoid AV blockers given bradycardia. Holding donepezil for this reason as well.  Stable for discharge at this time and can follow-up with PCP for BP medication titration.    BP (!) 142/63   Pulse 60   Temp 97.8 °F (36.6 °C)   Resp (!) 21   Ht 5' 6" (1.676 m)   Wt 86.2 kg (190 lb)   SpO2 98%   BMI 30.67 kg/m²   Physical Exam  HENT:      Head: Normocephalic and atraumatic.      Nose: Nose normal.      Mouth/Throat:      Mouth: Mucous membranes are moist.   Eyes:      Extraocular Movements: Extraocular movements intact.      Pupils: Pupils are equal, round, and reactive to light.   Cardiovascular:      Rate and Rhythm: Regular rhythm.   Pulmonary:      Breath sounds: Normal breath sounds.   Abdominal:      Palpations: Abdomen is soft.   Musculoskeletal:         General: Normal range of motion.      Cervical back: Neck supple.   Skin:     General: Skin is warm.   Neurological:      General: No focal deficit present.      Mental Status: She is alert. She is disoriented.   Psychiatric:         Mood and Affect: Mood normal.      CRANIAL NERVES      CN III, IV, VI   Pupils are equal, round, and reactive to light.     Goals of Care Treatment Preferences:  Code Status: Full Code         Consults:   Consults (From admission, onward)          Status Ordering Provider     Inpatient consult to Cardiology-[facility]  Once        Provider:  (Not yet assigned)    CUONG Cline            * AMS (altered mental status)    Admit to medical floor with telemetry.  Neuro checks.  MRI of the brain.  Inpatient Neurology consultation    CEM (acute kidney injury)  CEM is likely due to pre-renal azotemia due to intravascular volume depletion. Baseline creatinine is  1.39 . Most recent creatinine and eGFR are listed below.  Recent Labs     01/01/25  2213   CREATININE " 1.8*   EGFRNORACEVR 28*      Plan  - CEM is worsening. Will adjust treatment as follows: IV fluids  - Avoid nephrotoxins and renally dose meds for GFR listed above  - Monitor urine output, serial BMP, and adjust therapy as needed  - monitor renal function closely.    Near syncope    Orthostatic blood pressure and pulse.  Echocardiogram.  IV fluids.    Dementia  Patient with dementia with likely etiology of alzheimer's dementia. Dementia is moderate. The patient does not have signs of behavioral disturbance. Home dementia medications are Held or Continued: continued.. Continue non-pharmacologic interventions to prevent delirium (No VS between 11PM-5AM, activity during day, opening blinds, providing glasses/hearing aids, and up in chair during daytime). Will avoid narcotics and benzos unless absolutely necessary. PRN anti-psychotics are not prescribed to avoid self harm behaviors.    Hypothyroidism    Continue home medication.    HTN (hypertension)  Patient's blood pressure range in the last 24 hours was: BP  Min: 123/56  Max: 160/65.The patient's inpatient anti-hypertensive regimen is listed below:  Current Antihypertensives       Plan  - BP is controlled, no changes needed to their regimen  - monitor blood pressure closely.    Bradycardia  Repeat EKG.  Inpatient Cardiology consultation.      Final Active Diagnoses:    Diagnosis Date Noted POA    PRINCIPAL PROBLEM:  AMS (altered mental status) [R41.82] 01/02/2025 Yes    Bradycardia [R00.1] 01/02/2025 Yes    HTN (hypertension) [I10] 01/02/2025 Yes    Hypothyroidism [E03.9] 01/02/2025 Yes    Dementia [F03.90] 01/02/2025 Yes    Near syncope [R55] 01/02/2025 Yes    CEM (acute kidney injury) [N17.9] 01/02/2025 Yes      Problems Resolved During this Admission:       Discharged Condition: good    Disposition: Home or Self Care    Follow Up:   Follow-up Information       Azul Aguiar DO. Go on 1/7/2025.    Contact information:  [address]   [place] [address]                          Patient Instructions:      Diet Adult Regular     Notify your health care provider if you experience any of the following:  temperature >100.4     Notify your health care provider if you experience any of the following:  persistent nausea and vomiting or diarrhea     Notify your health care provider if you experience any of the following:  severe uncontrolled pain     Notify your health care provider if you experience any of the following:  difficulty breathing or increased cough     Notify your health care provider if you experience any of the following:  severe persistent headache     Notify your health care provider if you experience any of the following:  persistent dizziness, light-headedness, or visual disturbances     Notify your health care provider if you experience any of the following:  increased confusion or weakness     Activity as tolerated       Significant Diagnostic Studies: N/A    Pending Diagnostic Studies:       None           Medications:  Reconciled Home Medications:      Medication List        START taking these medications      levoFLOXacin 750 MG tablet  Commonly known as: LEVAQUIN  Take 1 tablet (750 mg total) by mouth every other day. for 2 days  Start taking on: January 4, 2025            CHANGE how you take these medications      triamterene-hydrochlorothiazide 37.5-25 mg 37.5-25 mg per capsule  Commonly known as: DYAZIDE  Take 1 capsule by mouth daily as needed (leg swelling).  What changed:   when to take this  reasons to take this            CONTINUE taking these medications      acetaminophen 325 MG tablet  Commonly known as: TYLENOL  Take 325 mg by mouth every 6 (six) hours as needed for Pain.     IPRATROPIUM BROMIDE NASL  by Nasal route.     levothyroxine 100 MCG tablet  Commonly known as: SYNTHROID  Take 100 mcg by mouth before breakfast.     LINZESS 145 mcg Cap capsule  Generic drug: linaCLOtide  Take 145 mcg by mouth before breakfast.     liothyronine 5 MCG  Tab  Commonly known as: CYTOMEL  Take 25 mcg by mouth once daily.     meloxicam 7.5 MG tablet  Commonly known as: MOBIC  Take 7.5 mg by mouth once daily.     memantine 5 MG Tab  Commonly known as: NAMENDA  Take 5 mg by mouth 2 (two) times daily.     naproxen 500 MG tablet  Commonly known as: NAPROSYN  Take 500 mg by mouth 2 (two) times daily with meals.     paroxetine 10 MG tablet  Commonly known as: PAXIL  Take 10 mg by mouth every morning.            STOP taking these medications      amLODIPine 5 MG tablet  Commonly known as: NORVASC     donepeziL 5 MG tablet  Commonly known as: ARICEPT     verapamiL 240 MG CR tablet  Commonly known as: CALAN-SR              Indwelling Lines/Drains at time of discharge:   Lines/Drains/Airways       None                   Time spent on the discharge of patient: 45 minutes         Alek Bedoya MD  Department of Hospital Medicine  [place] - Emergency Dept

## 2025-01-02 NOTE — ASSESSMENT & PLAN NOTE
CEM is likely due to pre-renal azotemia due to intravascular volume depletion. Baseline creatinine is  1.39 . Most recent creatinine and eGFR are listed below.  Recent Labs     01/01/25  2213   CREATININE 1.8*   EGFRNORACEVR 28*      Plan  - CEM is worsening. Will adjust treatment as follows: IV fluids  - Avoid nephrotoxins and renally dose meds for GFR listed above  - Monitor urine output, serial BMP, and adjust therapy as needed  - monitor renal function closely.

## 2025-01-02 NOTE — PLAN OF CARE
PCP: Scarlett Aguiar, appt 1/7; [address] [place] [id number].           Future Appointments   Date Time Provider Department Center   1/9/2025  9:30 AM Micki Cowart FNP-C [facility]        01/02/25 1146   Post-Acute Status   Post-Acute Authorization Other   Hospital Resources/Appts/Education Provided Appointments scheduled and added to AVS     Tonya Noel, VINCENT  [phone]  Emergency Room

## 2025-01-02 NOTE — CONSULTS
[facility] - Emergency Dept  Cardiology  Consult Note    Patient Name: Kike Bass  MRN: [id number]  Admission Date: 1/1/2025  Hospital Length of Stay: 0 days  Code Status: Full Code   Attending Provider: Alek Bedoya,*   Consulting Provider: Rishi Julio NP  Primary Care Physician: Baldomero Johns MD  Principal Problem:AMS (altered mental status)    Patient information was obtained from patient, past medical records, and ER records.     [facility]  Consult performed by: Rishi Julio NP  Consult ordered by: Lewis Hamm MD        Subjective:     Chief Complaint:  Near syncope     HPI:   HPI retrieved from chart and family at bedside. Patient with dementia and does not recall events. Kike Bass is a 82 y.o. female with hypertension, hypothyroidism, Alzheimer's dementia, pulmonary embolism who took her self off anticoagulation/ all medications. She is here visiting her daughter who cares for her 1 weekend per month. Patient presented to the ED after her daughter noted her to have worsening AMS and possible near syncope. At baseline, she is able to feed herself but is reported to have poor p.o. intake.  As per family they lowered her to the floor and then patient became more alert. Patient was seen in the ED and she had a CT scan of the head which showed no acute abnormality. Patient also was found to have a heart rate around the 50s. Patient is now back to her baseline. No focal deficits noted. HR 50s-60s SB without acute ischemic changes. Troponin negative x 3.     Past Medical History:   Diagnosis Date    Disorder of kidney and ureter     Hypertension     Memory loss     Pulmonary embolism     Rheumatoid arthritis, unspecified     Shingles        Past Surgical History:   Procedure Laterality Date    CHOLECYSTECTOMY         Review of patient's allergies indicates:   Allergen Reactions    Codeine      Other reaction(s): Shortness of breath,  Swelling of throat    Eggs [egg derived]      Other reaction(s): Shortness of breath  ALLERGY TO EGGS IF NOT THOROUGHLY COOKED    Penicillin g benzathine      Other reaction(s): Swelling of throat       No current facility-administered medications on file prior to encounter.     Current Outpatient Medications on File Prior to Encounter   Medication Sig    acetaminophen (TYLENOL) 325 MG tablet Take 325 mg by mouth every 6 (six) hours as needed for Pain.    amLODIPine (NORVASC) 5 MG tablet Take 5 mg by mouth once daily.    donepeziL (ARICEPT) 5 MG tablet Take 5 mg by mouth every evening.    IPRATROPIUM BROMIDE NASL by Nasal route.    levothyroxine (SYNTHROID) 100 MCG tablet Take 100 mcg by mouth before breakfast.    linaCLOtide (LINZESS) 145 mcg Cap capsule Take 145 mcg by mouth before breakfast.    liothyronine (CYTOMEL) 5 MCG Tab Take 25 mcg by mouth once daily.    meloxicam (MOBIC) 7.5 MG tablet Take 7.5 mg by mouth once daily.    memantine (NAMENDA) 5 MG Tab Take 5 mg by mouth 2 (two) times daily.    naproxen (NAPROSYN) 500 MG tablet Take 500 mg by mouth 2 (two) times daily with meals.    paroxetine (PAXIL) 10 MG tablet Take 10 mg by mouth every morning.    triamterene-hydrochlorothiazide 37.5-25 mg (DYAZIDE) 37.5-25 mg per capsule Take 1 capsule by mouth every morning.    verapamiL (CALAN-SR) 240 MG CR tablet Take 240 mg by mouth every evening.     Family History       Problem Relation (Age of Onset)    Cancer Sister, Brother    Dementia Brother    Diabetes Mother, Brother    Heart disease Father    Liver disease Father          Tobacco Use    Smoking status: Former     Types: Cigarettes    Smokeless tobacco: Never   Substance and Sexual Activity    Alcohol use: Not Currently    Drug use: Never    Sexual activity: Not on file     Review of Systems   Unable to perform ROS: Dementia     Objective:     Vital Signs (Most Recent):  Temp: 97.8 °F (36.6 °C) (01/01/25 2145)  Pulse: 60 (01/02/25 0754)  Resp: 17 (01/02/25  8633)  BP: (!) 114/55 (01/02/25 0725)  SpO2: 100 % (01/02/25 0754) Vital Signs (24h Range):  Temp:  [97.8 °F (36.6 °C)] 97.8 °F (36.6 °C)  Pulse:  [47-60] 60  Resp:  [12-18] 17  SpO2:  [96 %-100 %] 100 %  BP: (114-160)/(55-67) 114/55     Weight: 86.2 kg (190 lb)  Body mass index is 30.67 kg/m².    SpO2: 100 %         Intake/Output Summary (Last 24 hours) at 1/2/2025 0949  Last data filed at 1/2/2025 0226  Gross per 24 hour   Intake 43.29 ml   Output --   Net 43.29 ml       Lines/Drains/Airways       Peripheral Intravenous Line  Duration                  Peripheral IV - Single Lumen 18 G Left Antecubital -- days                     Physical Exam  Constitutional:       General: She is not in acute distress.     Appearance: She is not diaphoretic.   HENT:      Head: Atraumatic.   Eyes:      General:         Right eye: No discharge.         Left eye: No discharge.   Cardiovascular:      Rate and Rhythm: Normal rate and regular rhythm.   Pulmonary:      Effort: Pulmonary effort is normal.      Breath sounds: Normal breath sounds.   Abdominal:      General: Bowel sounds are normal.      Palpations: Abdomen is soft.   Musculoskeletal:      Right lower leg: No edema.      Left lower leg: No edema.   Skin:     General: Skin is warm and dry.   Neurological:      Mental Status: She is alert and oriented to person, place, and time.          Significant Labs: BMP:   Recent Labs   Lab 01/01/25 2213 01/02/25  0347   * 112*    139   K 3.9 4.7    105   CO2 22* 25   BUN 24* 24*   CREATININE 1.8* 1.6*   CALCIUM 8.1* 8.3*   MG 1.9  --    , CMP   Recent Labs   Lab 01/01/25 2213 01/02/25  0347    139   K 3.9 4.7    105   CO2 22* 25   * 112*   BUN 24* 24*   CREATININE 1.8* 1.6*   CALCIUM 8.1* 8.3*   PROT 6.6  --    ALBUMIN 2.8*  --    BILITOT 0.2  --    ALKPHOS 86  --    AST 25  --    ALT 16  --    ANIONGAP 12 9   , CBC   Recent Labs   Lab 01/01/25  2213 01/02/25  0347   WBC 7.11 6.93   HGB 11.4*  "11.1*   HCT 33.6* 32.9*    178   , INR   Recent Labs   Lab 01/02/25  0347   INR 1.0   , Lipid Panel   Recent Labs   Lab 01/02/25  0347   CHOL 144   HDL 36*   LDLCALC 97.8   TRIG 51   CHOLHDL 25.0   , Troponin No results for input(s): "TROPONINIHS" in the last 48 hours., and All pertinent lab results from the last 24 hours have been reviewed.    Significant Imaging: Echocardiogram: Transthoracic echo (TTE) complete (Cupid Only):   Results for orders placed or performed during the hospital encounter of 01/01/25   Echo   Result Value Ref Range    BSA 2 m2    Byrd's Biplane MOD Ejection Fraction 71 %    A2C EF 63 %    A4C EF 77 %    LVOT stroke volume 87.8 cm3    LVIDd 3.8 3.5 - 6.0 cm    LV Systolic Volume 27.35 mL    LV Systolic Volume Index 14.0 mL/m2    LVIDs 2.7 2.1 - 4.0 cm    LV ESV A2C 46.42 mL    LV Diastolic Volume 62.21 mL    LV ESV A4C 44.73 mL    LV Diastolic Volume Index 31.74 mL/m2    LV EDV A2C [id number] mL    LV EDV A4C 65.26 mL    Left Ventricular End Systolic Volume by Teichholz Method 27.35 mL    Left Ventricular End Diastolic Volume by Teichholz Method 62.21 mL    IVS 1.1 0.6 - 1.1 cm    LVOT diameter 2.2 cm    LVOT area 3.8 cm2    FS 28.9 28 - 44 %    Left Ventricle Relative Wall Thickness 0.53 cm    PW 1.0 0.6 - 1.1 cm    LV mass 125.8 g    LV Mass Index 64.2 g/m2    MV Peak E Quinton 0.57 m/s    TDI LATERAL 0.08 m/s    TDI SEPTAL 0.08 m/s    E/E' ratio 7.13 m/s    MV Peak A Quinton 0.99 m/s    TR Max Quinton 2.44 m/s    E/A ratio 0.58     E wave deceleration time 368.97 msec    LV SEPTAL E/E' RATIO 7.13 m/s    LV LATERAL E/E' RATIO 7.13 m/s    LVOT peak quinton 1.1 m/s    Left Ventricular Outflow Tract Mean Velocity 0.71 cm/s    Left Ventricular Outflow Tract Mean Gradient 2.23 mmHg    RV- haile basal diam 4.1 cm    RV S' 7.81 cm/s    TAPSE 1.79 cm    RV/LV Ratio 1.08 cm    LA Vol (MOD) 48.37 mL    SARA (MOD) 24.7 mL/m2    RA area length vol 41.92 mL    RA Area 15.6 cm2    RA Vol 43.31 mL    AV " regurgitation pressure 1/2 time [id number] ms    AR Max Quinton 3.61 m/s    AV mean gradient 3.4 mmHg    AV peak gradient 6.8 mmHg    Ao peak quinton 1.3 m/s    Ao VTI 26.9 cm    LVOT peak VTI 23.1 cm    AV valve area 3.3 cm²    AV Velocity Ratio 0.85     AV index (prosthetic) 0.86     SINDY by Velocity Ratio 3.2 cm²    MV mean gradient 1 mmHg    MV peak gradient 5 mmHg    MV stenosis pressure 1/2 time 107.00 ms    MV valve area p 1/2 method 2.06 cm2    MV valve area by continuity eq 2.66 cm2    MV VTI 33.0 cm    Triscuspid Valve Regurgitation Peak Gradient 24 mmHg    Sinus 3.33 cm    STJ 2.95 cm    Ascending aorta 2.90 cm    IVC diameter 1.56 cm    Mean e' 0.08 m/s    ZLVIDS -1.94     ZLVIDD -3.90     LA area A4C 19.19 cm2    LA area A2C 17.74 cm2     Assessment and Plan:     Near syncope  Patient took melatonin prior to event  EKG without acute ischemic changes, no blocks or pauses on tele review  TTE pending for completeness  Patient with marked dementia, unable to recall event or symptoms  Hemodynamically stable       HTN (hypertension)  Patient's blood pressure range in the last 24 hours was: BP  Min: 114/55  Max: 160/65.The patient's inpatient anti-hypertensive regimen is listed below:  Current Antihypertensives       Plan  - BP is controlled, no changes needed to their regimen  - Patient self discontinued all medications UNK months ago. If BP elevated, consider resumption of low dose norvasc. Would not recommend resumption of Dyazide with decreased p.o. intake. Hold Verapamil.     Bradycardia  Stable SB without blocks or pauses         VTE Risk Mitigation (From admission, onward)           Ordered     enoxaparin injection 30 mg  Every 24 hours         01/02/25 0842     IP VTE HIGH RISK PATIENT  Once         01/02/25 0124     Place sequential compression device  Until discontinued         01/02/25 0124                    Thank you for your consult. I will follow-up with patient. Please contact us if you have  any additional questions.    Rishi Julio NP  Cardiology   [facility] - Emergency Dept

## 2025-01-02 NOTE — ASSESSMENT & PLAN NOTE
Admit to medical floor with telemetry.  Neuro checks.  MRI of the brain.  Inpatient Neurology consultation

## 2025-01-02 NOTE — SUBJECTIVE & OBJECTIVE
Past Medical History:   Diagnosis Date    Disorder of kidney and ureter     Hypertension     Memory loss     Pulmonary embolism     Rheumatoid arthritis, unspecified     Shingles        Past Surgical History:   Procedure Laterality Date    CHOLECYSTECTOMY         Review of patient's allergies indicates:   Allergen Reactions    Codeine      Other reaction(s): Shortness of breath, Swelling of throat    Eggs [egg derived]      Other reaction(s): Shortness of breath  ALLERGY TO EGGS IF NOT THOROUGHLY COOKED    Penicillin g benzathine      Other reaction(s): Swelling of throat       No current facility-administered medications on file prior to encounter.     Current Outpatient Medications on File Prior to Encounter   Medication Sig    acetaminophen (TYLENOL) 325 MG tablet Take 325 mg by mouth every 6 (six) hours as needed for Pain.    amLODIPine (NORVASC) 5 MG tablet Take 5 mg by mouth once daily.    donepeziL (ARICEPT) 5 MG tablet Take 5 mg by mouth every evening.    IPRATROPIUM BROMIDE NASL by Nasal route.    levothyroxine (SYNTHROID) 100 MCG tablet Take 100 mcg by mouth before breakfast.    linaCLOtide (LINZESS) 145 mcg Cap capsule Take 145 mcg by mouth before breakfast.    liothyronine (CYTOMEL) 5 MCG Tab Take 25 mcg by mouth once daily.    meloxicam (MOBIC) 7.5 MG tablet Take 7.5 mg by mouth once daily.    memantine (NAMENDA) 5 MG Tab Take 5 mg by mouth 2 (two) times daily.    naproxen (NAPROSYN) 500 MG tablet Take 500 mg by mouth 2 (two) times daily with meals.    paroxetine (PAXIL) 10 MG tablet Take 10 mg by mouth every morning.    triamterene-hydrochlorothiazide 37.5-25 mg (DYAZIDE) 37.5-25 mg per capsule Take 1 capsule by mouth every morning.    verapamiL (CALAN-SR) 240 MG CR tablet Take 240 mg by mouth every evening.     Family History       Problem Relation (Age of Onset)    Cancer Sister, Brother    Dementia Brother    Diabetes Mother, Brother    Heart disease Father    Liver disease Father          Tobacco  Use    Smoking status: Former     Types: Cigarettes    Smokeless tobacco: Never   Substance and Sexual Activity    Alcohol use: Not Currently    Drug use: Never    Sexual activity: Not on file     Review of Systems   Constitutional:  Positive for activity change.   HENT: Negative.     Eyes: Negative.    Respiratory: Negative.     Cardiovascular: Negative.    Gastrointestinal: Negative.    Musculoskeletal: Negative.    Skin: Negative.    Neurological:  Positive for dizziness and syncope.   Psychiatric/Behavioral:  Positive for confusion.      Objective:     Vital Signs (Most Recent):  Temp: 97.8 °F (36.6 °C) (01/01/25 2145)  Pulse: (!) 52 (01/02/25 0102)  Resp: 15 (01/02/25 0102)  BP: (!) 123/56 (01/02/25 0102)  SpO2: 98 % (01/02/25 0102) Vital Signs (24h Range):  Temp:  [97.8 °F (36.6 °C)] 97.8 °F (36.6 °C)  Pulse:  [47-52] 52  Resp:  [12-18] 15  SpO2:  [98 %-100 %] 98 %  BP: (123-160)/(56-67) 123/56        There is no height or weight on file to calculate BMI.     Physical Exam  HENT:      Head: Normocephalic and atraumatic.      Nose: Nose normal.      Mouth/Throat:      Mouth: Mucous membranes are moist.   Eyes:      Extraocular Movements: Extraocular movements intact.      Pupils: Pupils are equal, round, and reactive to light.   Cardiovascular:      Rate and Rhythm: Regular rhythm.   Pulmonary:      Breath sounds: Normal breath sounds.   Abdominal:      Palpations: Abdomen is soft.   Musculoskeletal:         General: Normal range of motion.      Cervical back: Neck supple.   Skin:     General: Skin is warm.   Neurological:      General: No focal deficit present.      Mental Status: She is alert. She is disoriented.   Psychiatric:         Mood and Affect: Mood normal.              CRANIAL NERVES     CN III, IV, VI   Pupils are equal, round, and reactive to light.       Significant Labs: All pertinent labs within the past 24 hours have been reviewed.  Recent Lab Results         01/01/25  8463        Albumin 2.8        ALP 86       ALT 16       Anion Gap 12       AST 25       Baso # 0.03       Basophil % 0.4       BILIRUBIN TOTAL 0.2  Comment: For infants and newborns, interpretation of results should be based  on gestational age, weight and in agreement with clinical  observations.    Premature Infant recommended reference ranges:  Up to 24 hours.............<8.0 mg/dL  Up to 48 hours............<12.0 mg/dL  3-5 days..................<15.0 mg/dL  6-29 days.................<15.0 mg/dL         BUN 24       Calcium 8.1       Chloride 106       CO2 22       Creatinine 1.8       Differential Method Automated       eGFR 28       Eos # 0.1       Eos % 1.8       Glucose 119       Gran # (ANC) 3.7       Gran % 52.4       Hematocrit 33.6       Hemoglobin 11.4       Immature Grans (Abs) 0.03  Comment: Mild elevation in immature granulocytes is non specific and   can be seen in a variety of conditions including stress response,   acute inflammation, trauma and pregnancy. Correlation with other   laboratory and clinical findings is essential.         Immature Granulocytes 0.4       Lymph # 2.5       Lymph % 35.0       Magnesium  1.9       MCH 29.7       MCHC 33.9       MCV 88       Mono # 0.7       Mono % 10.0       MPV 11.2       nRBC 0       Platelet Count 172       Potassium 3.9       PROTEIN TOTAL 6.6       RBC 3.84       RDW 13.8       Sodium 140       Troponin I 0.015  Comment: The reference interval for Troponin I represents the 99th percentile   cutoff   for our facility and is consistent with 3rd generation assay   performance.         WBC 7.11               Significant Imaging: I have reviewed all pertinent imaging results/findings within the past 24 hours.

## 2025-01-02 NOTE — ED NOTES
Pt not currently in ED room for this RN's assessment. Per oscar Flores nurse, pt was transported to ECHO. To assume care once returned.

## 2025-01-02 NOTE — PROGRESS NOTES
Pharmacist Renal Dose Adjustment Note    Kike Bass is a 84 y.o. female being treated with the medication enoxaparin    Patient Data:    Vital Signs (Most Recent):  Temp: 97.8 °F (36.6 °C) (01/01/25 2145)  Pulse: 60 (01/02/25 0754)  Resp: 17 (01/02/25 0754)  BP: (!) 114/55 (01/02/25 0725)  SpO2: 100 % (01/02/25 0754) Vital Signs (72h Range):  Temp:  [97.8 °F (36.6 °C)]   Pulse:  [47-60]   Resp:  [12-18]   BP: (114-160)/(55-67)   SpO2:  [96 %-100 %]      Recent Labs   Lab 01/01/25 2213 01/02/25 0347   CREATININE 1.8* 1.6*     Serum creatinine: 1.6 mg/dL (H) 01/02/25 0347  Estimated creatinine clearance: 29.5 mL/min (A)    Medication:enoxaparin dose: 40 mg frequency Q 24 hrs will be changed to medication:enoxaparin dose:30 mg frequency:Q 24 hrs    Pharmacist's Name: Marga Baker  Pharmacist's Extension: [phone]

## 2025-01-02 NOTE — ASSESSMENT & PLAN NOTE
Patient took melatonin prior to event  EKG without acute ischemic changes, no blocks or pauses on tele review  TTE pending for completeness  Patient with marked dementia, unable to recall event or symptoms  Hemodynamically stable

## 2025-01-02 NOTE — ED NOTES
Pt returned from ECHO without incident, this RN assuming care. Pt is alert and oriented to self and place however doesn't remember why she is here or who brought her. Pt denies dizziness while lying at this time, denies HA, CP, SOB. Pt able to tolerate PO meds without coughing or drooling. NADN at this time.     Meloxicam placed on hold per Dr. Bedoya due to pt's current kidney function.    Otherwise, CM/BP/POX cont'd. Bed low and locked, SR x 2, call light in reach.

## 2025-01-03 LAB
OHS QRS DURATION: 82 MS
OHS QRS DURATION: 86 MS
OHS QTC CALCULATION: 481 MS
OHS QTC CALCULATION: 481 MS

## 2025-01-04 LAB — BACTERIA UR CULT: ABNORMAL

## 2025-01-04 RX ORDER — CEPHALEXIN 500 MG/1
500 CAPSULE ORAL 4 TIMES DAILY
Qty: 20 CAPSULE | Refills: 0 | Status: SHIPPED | OUTPATIENT
Start: 2025-01-04 | End: 2025-01-09

## 2025-08-15 ENCOUNTER — OFFICE VISIT (OUTPATIENT)
Dept: PODIATRY | Facility: CLINIC | Age: 85
End: 2025-08-15
Payer: MEDICARE

## 2025-08-15 VITALS — DIASTOLIC BLOOD PRESSURE: 62 MMHG | SYSTOLIC BLOOD PRESSURE: 140 MMHG | HEART RATE: 63 BPM

## 2025-08-15 DIAGNOSIS — B35.1 ONYCHOMYCOSIS DUE TO DERMATOPHYTE: ICD-10-CM

## 2025-08-15 DIAGNOSIS — I73.9 PVD (PERIPHERAL VASCULAR DISEASE): Primary | ICD-10-CM

## 2025-08-15 DIAGNOSIS — B35.3 TINEA PEDIS OF LEFT FOOT: ICD-10-CM

## 2025-08-15 PROCEDURE — 3078F DIAST BP <80 MM HG: CPT | Mod: CPTII,S$GLB,, | Performed by: STUDENT IN AN ORGANIZED HEALTH CARE EDUCATION/TRAINING PROGRAM

## 2025-08-15 PROCEDURE — 99999 PR PBB SHADOW E&M-EST. PATIENT-LVL III: CPT | Mod: PBBFAC,,, | Performed by: STUDENT IN AN ORGANIZED HEALTH CARE EDUCATION/TRAINING PROGRAM

## 2025-08-15 PROCEDURE — 1159F MED LIST DOCD IN RCRD: CPT | Mod: CPTII,S$GLB,, | Performed by: STUDENT IN AN ORGANIZED HEALTH CARE EDUCATION/TRAINING PROGRAM

## 2025-08-15 PROCEDURE — 3077F SYST BP >= 140 MM HG: CPT | Mod: CPTII,S$GLB,, | Performed by: STUDENT IN AN ORGANIZED HEALTH CARE EDUCATION/TRAINING PROGRAM

## 2025-08-15 PROCEDURE — 1126F AMNT PAIN NOTED NONE PRSNT: CPT | Mod: CPTII,S$GLB,, | Performed by: STUDENT IN AN ORGANIZED HEALTH CARE EDUCATION/TRAINING PROGRAM

## 2025-08-15 PROCEDURE — 11721 DEBRIDE NAIL 6 OR MORE: CPT | Mod: Q8,S$GLB,, | Performed by: STUDENT IN AN ORGANIZED HEALTH CARE EDUCATION/TRAINING PROGRAM

## 2025-08-15 PROCEDURE — 99204 OFFICE O/P NEW MOD 45 MIN: CPT | Mod: 25,S$GLB,, | Performed by: STUDENT IN AN ORGANIZED HEALTH CARE EDUCATION/TRAINING PROGRAM

## 2025-08-15 RX ORDER — CLOTRIMAZOLE AND BETAMETHASONE DIPROPIONATE 10; .64 MG/G; MG/G
CREAM TOPICAL 2 TIMES DAILY
Qty: 45 G | Refills: 1 | Status: SHIPPED | OUTPATIENT
Start: 2025-08-15

## 2025-08-30 ENCOUNTER — HOSPITAL ENCOUNTER (OUTPATIENT)
Dept: RADIOLOGY | Facility: HOSPITAL | Age: 85
Discharge: HOME | End: 2025-08-30
Attending: STUDENT IN AN ORGANIZED HEALTH CARE EDUCATION/TRAINING PROGRAM
Payer: MEDICARE

## 2025-08-30 DIAGNOSIS — I73.9 PVD (PERIPHERAL VASCULAR DISEASE): ICD-10-CM

## 2025-08-30 PROCEDURE — 93925 LOWER EXTREMITY STUDY: CPT | Mod: TC

## 2025-08-30 PROCEDURE — 93925 LOWER EXTREMITY STUDY: CPT | Mod: 26,,, | Performed by: RADIOLOGY
